# Patient Record
Sex: MALE | Race: BLACK OR AFRICAN AMERICAN | Employment: OTHER | RURAL
[De-identification: names, ages, dates, MRNs, and addresses within clinical notes are randomized per-mention and may not be internally consistent; named-entity substitution may affect disease eponyms.]

---

## 2017-03-09 ENCOUNTER — OFFICE VISIT (OUTPATIENT)
Dept: FAMILY MEDICINE CLINIC | Age: 62
End: 2017-03-09

## 2017-03-09 VITALS
OXYGEN SATURATION: 99 % | DIASTOLIC BLOOD PRESSURE: 72 MMHG | SYSTOLIC BLOOD PRESSURE: 114 MMHG | BODY MASS INDEX: 26.97 KG/M2 | WEIGHT: 167.8 LBS | HEART RATE: 81 BPM | HEIGHT: 66 IN | TEMPERATURE: 98.2 F | RESPIRATION RATE: 16 BRPM

## 2017-03-09 DIAGNOSIS — Z02.4 ENCOUNTER FOR CDL (COMMERCIAL DRIVING LICENSE) EXAM: Primary | ICD-10-CM

## 2017-03-09 LAB
BILIRUB UR QL STRIP: NEGATIVE
GLUCOSE UR-MCNC: NEGATIVE MG/DL
KETONES P FAST UR STRIP-MCNC: NEGATIVE MG/DL
PH UR STRIP: 6.5 [PH] (ref 4.6–8)
PROT UR QL STRIP: NORMAL MG/DL
SP GR UR STRIP: 1.01 (ref 1–1.03)
UA UROBILINOGEN AMB POC: NORMAL (ref 0.2–1)
URINALYSIS CLARITY POC: NORMAL
URINALYSIS COLOR POC: NORMAL
URINE BLOOD POC: NORMAL
URINE LEUKOCYTES POC: NORMAL
URINE NITRITES POC: NEGATIVE

## 2017-03-09 RX ORDER — VERAPAMIL HYDROCHLORIDE 120 MG/1
120 TABLET, FILM COATED ORAL 3 TIMES DAILY
Status: ON HOLD | COMMUNITY
End: 2022-01-04 | Stop reason: CLARIF

## 2017-03-09 NOTE — PROGRESS NOTES
Forrest Washington is a 58 y.o. male who presents to the office today with the following:  Chief Complaint   Patient presents with    Employment Physical     CDL       HPI    ROS  See HPI. Allergies   Allergen Reactions    Asa-Acetaminophen-Caff-Buffers Unknown (comments)       Current Outpatient Prescriptions   Medication Sig    verapamil (CALAN) 120 mg tablet Take 120 mg by mouth three (3) times daily. No current facility-administered medications for this visit.         Past Medical History:   Diagnosis Date    Hypertension        Past Surgical History:   Procedure Laterality Date    HX HERNIA REPAIR      HX PROSTATECTOMY         Social History     Social History    Marital status:      Spouse name: N/A    Number of children: N/A    Years of education: N/A     Social History Main Topics    Smoking status: Former Smoker     Quit date: 4/1/1988    Smokeless tobacco: None    Alcohol use No    Drug use: No    Sexual activity: Not Asked     Other Topics Concern    None     Social History Narrative    None       Family History   Problem Relation Age of Onset    Dementia Mother          Physical Exam:  Visit Vitals    /72 (BP 1 Location: Left arm, BP Patient Position: Sitting)    Pulse 81    Temp 98.2 °F (36.8 °C) (Oral)    Resp 16    Ht 5' 6\" (1.676 m)    Wt 167 lb 12.8 oz (76.1 kg)    SpO2 99%    BMI 27.08 kg/m2     Physical Exam    Assessment/Plan:  {No Diagnosis Found}    Follow-up Disposition: Not on File    Yakelin Arellano PA-C

## 2017-03-09 NOTE — MR AVS SNAPSHOT
Visit Information Date & Time Provider Department Dept. Phone Encounter #  
 3/9/2017 10:00 AM Ricardo Ramsey PA-C [address] [id number] Follow-up Instructions Return in about 1 year (around 3/9/2018). Follow-up and Disposition History Upcoming Health Maintenance Date Due Hepatitis C Screening 1955 DTaP/Tdap/Td series (1 - Tdap) 2/16/1976 FOBT Q 1 YEAR AGE 50-75 2/16/2005 ZOSTER VACCINE AGE 60> 2/16/2015 INFLUENZA AGE 9 TO ADULT 8/1/2016 Allergies as of 3/9/2017  Review Complete On: 3/9/2017 By: Ricardo Ramsey PA-C Severity Noted Reaction Type Reactions Asa-acetaminophen-caff-buffers  03/09/2017    Unknown (comments) Current Immunizations  Never Reviewed No immunizations on file. Not reviewed this visit You Were Diagnosed With   
  
 Codes Comments Encounter for CDL (commercial driving license) exam    -  Primary ICD-10-CM: Z02.4 ICD-9-CM: V70.5 Vitals BP Pulse Temp Resp Height(growth percentile) Weight(growth percentile) 114/72 (BP 1 Location: Left arm, BP Patient Position: Sitting) 81 98.2 °F (36.8 °C) (Oral) 16 5' 6\" (1.676 m) 167 lb 12.8 oz (76.1 kg) SpO2 BMI Smoking Status 99% 27.08 kg/m2 Former Smoker Vitals History BMI and BSA Data Body Mass Index Body Surface Area 27.08 kg/m 2 1.88 m 2 Your Updated Medication List  
  
   
This list is accurate as of: 3/9/17 11:18 AM.  Always use your most recent med list.  
  
  
  
  
 verapamil 120 mg tablet Commonly known as:  CALAN Take 120 mg by mouth three (3) times daily. We Performed the Following AMB POC URINALYSIS DIP STICK MANUAL W/O MICRO [29983 CPT(R)] Follow-up Instructions Return in about 1 year (around 3/9/2018). Introducing [facility] & HEALTH SERVICES!    
 Kirk Garcia introduces [employer] patient portal. Now you can access parts of your medical record, email your doctor's office, and request medication refills online. 1. In your internet browser, go to [email or web address] 2. Click on the First Time User? Click Here link in the Sign In box. You will see the New Member Sign Up page. 3. Enter your [facility] Access Code exactly as it appears below. You will not need to use this code after youve completed the sign-up process. If you do not sign up before the expiration date, you must request a new code. · [facility] Access Code: [email or web address] Expires: 6/7/2017  9:48 AM 
 
4. Enter the last four digits of your Social Security Number (xxxx) and Date of Birth (mm/dd/yyyy) as indicated and click Submit. You will be taken to the next sign-up page. 5. Create a [facility] ID. This will be your [facility] login ID and cannot be changed, so think of one that is secure and easy to remember. 6. Create a [facility] password. You can change your password at any time. 7. Enter your Password Reset Question and Answer. This can be used at a later time if you forget your password. 8. Enter your e-mail address. You will receive e-mail notification when new information is available in [address] 9. Click Sign Up. You can now view and download portions of your medical record. 10. Click the Download Summary menu link to download a portable copy of your medical information. If you have questions, please visit the Frequently Asked Questions section of the [facility] website. Remember, [facility] is NOT to be used for urgent needs. For medical emergencies, dial 911. Now available from your iPhone and Android! Please provide this summary of care documentation to your next provider. Your primary care clinician is listed as NOT ON FILE. If you have any questions after today's visit, please call [phone].

## 2017-03-09 NOTE — PROGRESS NOTES
Emir Mcdermott is a 58 y.o. 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 he reports feeling well today with no complaints. Does report recent prostatectomy for prostate CA (in remission), still leaking urine, but off all pain medication, feeling well, only 2 more days on abx. Only other med is Verapamil for BP which is well-controlled. *See scanned media for full history and physical exam.    Visit Vitals    /72 (BP 1 Location: Left arm, BP Patient Position: Sitting)    Pulse 81    Temp 98.2 °F (36.8 °C) (Oral)    Resp 16    Ht 5' 6\" (1.676 m)    Wt 167 lb 12.8 oz (76.1 kg)    SpO2 99%    BMI 27.08 kg/m2       Results for orders placed or performed in visit on 03/09/17   AMB POC URINALYSIS DIP STICK MANUAL W/O MICRO   Result Value Ref Range    Color (UA POC) Red     Clarity (UA POC) Slightly Cloudy     Glucose (UA POC) Negative Negative    Bilirubin (UA POC) Negative Negative    Ketones (UA POC) Negative Negative    Specific gravity (UA POC) 1.010 1.001 - 1.035    Blood (UA POC) 3+ Negative    pH (UA POC) 6.5 4.6 - 8.0    Protein (UA POC) 2+ Negative mg/dL    Urobilinogen (UA POC) 0.2 mg/dL 0.2 - 1    Nitrites (UA POC) Negative Negative    Leukocyte esterase (UA POC) Trace Negative     DOT forms completed and scanned into patient's chart.     Marck Boyer PA-C

## 2018-03-12 ENCOUNTER — OFFICE VISIT (OUTPATIENT)
Dept: INTERNAL MEDICINE CLINIC | Age: 63
End: 2018-03-12

## 2018-03-12 VITALS
OXYGEN SATURATION: 96 % | SYSTOLIC BLOOD PRESSURE: 132 MMHG | HEIGHT: 60 IN | HEART RATE: 75 BPM | BODY MASS INDEX: 36.32 KG/M2 | RESPIRATION RATE: 16 BRPM | TEMPERATURE: 97.8 F | DIASTOLIC BLOOD PRESSURE: 80 MMHG | WEIGHT: 185 LBS

## 2018-03-12 DIAGNOSIS — Z02.89 ENCOUNTER FOR OCCUPATIONAL PHYSICAL EXAMINATION: Primary | ICD-10-CM

## 2018-03-12 DIAGNOSIS — I10 ESSENTIAL HYPERTENSION: ICD-10-CM

## 2018-03-12 PROBLEM — Z85.46 HISTORY OF PROSTATE CANCER: Status: ACTIVE | Noted: 2018-03-12

## 2018-03-12 LAB
BILIRUB UR QL STRIP: NEGATIVE
GLUCOSE UR-MCNC: NEGATIVE MG/DL
KETONES P FAST UR STRIP-MCNC: NEGATIVE MG/DL
PH UR STRIP: 6.5 [PH] (ref 4.6–8)
PROT UR QL STRIP: NEGATIVE
SP GR UR STRIP: 1.02 (ref 1–1.03)
UA UROBILINOGEN AMB POC: NORMAL (ref 0.2–1)
URINALYSIS CLARITY POC: CLEAR
URINALYSIS COLOR POC: NORMAL
URINE BLOOD POC: NORMAL
URINE LEUKOCYTES POC: NEGATIVE
URINE NITRITES POC: NEGATIVE

## 2018-03-12 RX ORDER — BICALUTAMIDE 50 MG/1
50 TABLET, FILM COATED ORAL DAILY
COMMUNITY
End: 2020-02-21 | Stop reason: ALTCHOICE

## 2018-03-12 NOTE — PROGRESS NOTES
Chief Complaint   Patient presents with    Physical     DOT     I have reviewed the patient's medical history in detail and updated the computerized patient record. Health Maintenance reviewed. 1. Have you been to the ER, urgent care clinic since your last visit? Hospitalized since your last visit?no    2. Have you seen or consulted any other health care providers outside of the [address] [place] [address] [place] since your last visit? Include any pap smears or colon screening. No    Encouraged pt to discuss pt's wishes with spouse/partner/family and bring them in the next appt to follow thru with the Advanced Directive    Fall Risk Assessment, last 12 mths 3/12/2018   Able to walk? Yes   Fall in past 12 months? No       PHQ over the last two weeks 3/12/2018   Little interest or pleasure in doing things Several days   Feeling down, depressed or hopeless Several days   Total Score PHQ 2 2       Abuse Screening Questionnaire 3/12/2018   Do you ever feel afraid of your partner? N   Are you in a relationship with someone who physically or mentally threatens you? N   Is it safe for you to go home?  Y       ADL Assessment 3/12/2018   Feeding yourself No Help Needed   Getting from bed to chair No Help Needed   Getting dressed No Help Needed   Bathing or showering No Help Needed   Walk across the room (includes cane/walker) No Help Needed   Using the telphone No Help Needed   Taking your medications No Help Needed   Preparing meals No Help Needed   Managing money (expenses/bills) No Help Needed   Moderately strenuous housework (laundry) No Help Needed   Shopping for personal items (toiletries/medicines) No Help Needed   Shopping for groceries No Help Needed   Driving No Help Needed   Climbing a flight of stairs No Help Needed   Getting to places beyond walking distances No Help Needed

## 2018-03-12 NOTE — MR AVS SNAPSHOT
[address] [facility] 390 3946 [facility] 
[phone] Patient: Sarita Jameson MRN: [id number] [facility]:[birth year] Visit Information Date & Time Provider Department Dept. Phone Encounter #  
 3/12/2018  2:00 PM Sarah Christianson MD [place] [address] [phone] [id number] Follow-up Instructions Return in about 1 year (around 3/12/2019) for routine follow up. Upcoming Health Maintenance Date Due Hepatitis C Screening 1955 DTaP/Tdap/Td series (1 - Tdap) 2/16/1976 FOBT Q 1 YEAR AGE 50-75 2/16/2005 ZOSTER VACCINE AGE 60> 12/16/2014 Allergies as of 3/12/2018  Review Complete On: 3/12/2018 By: Sarah Christianson MD  
  
 Severity Noted Reaction Type Reactions Asa-acetaminophen-caff-buffers  03/09/2017    Unknown (comments) Current Immunizations  Never Reviewed No immunizations on file. Not reviewed this visit You Were Diagnosed With   
  
 Codes Comments Encounter for occupational physical examination    -  Primary ICD-10-CM: Z02.1 ICD-9-CM: V70.5 Essential hypertension     ICD-10-CM: I10 
ICD-9-CM: 401.9 Vitals BP Pulse Temp Resp Height(growth percentile) Weight(growth percentile) 132/80 (BP 1 Location: Right arm, BP Patient Position: Sitting) 75 97.8 °F (36.6 °C) (Oral) 16 5' (1.524 m) 185 lb (83.9 kg) SpO2 BMI Smoking Status 96% 36.13 kg/m2 Former Smoker Vitals History BMI and BSA Data Body Mass Index Body Surface Area  
 36.13 kg/m 2 1.88 m 2 Preferred Pharmacy Pharmacy Name Phone [address] [facility],[address], [address] [place] [address] [phone] [facility] [phone] Your Updated Medication List  
  
   
This list is accurate as of 3/12/18  2:40 PM.  Always use your most recent med list.  
  
  
  
  
 bicalutamide 50 mg tablet Commonly known as:  CASODEX Take 50 mg by mouth daily. verapamil 120 mg tablet Commonly known as:  CALAN Take 120 mg by mouth three (3) times daily. We Performed the Following AMB POC URINALYSIS DIP STICK AUTO W/O MICRO [43968 CPT(R)] Follow-up Instructions Return in about 1 year (around 3/12/2019) for routine follow up. Introducing [facility] & HEALTH SERVICES! [facility] introduces [employer] patient portal. Now you can access parts of your medical record, email your doctor's office, and request medication refills online. 1. In your internet browser, go to https://[employer]. [employer] 2. Click on the First Time User? Click Here link in the Sign In box. You will see the New Member Sign Up page. 3. Enter your [employer] Access Code exactly as it appears below. You will not need to use this code after youve completed the sign-up process. If you do not sign up before the expiration date, you must request a new code. · [employer] Access Code: [email or web address] Expires: 6/10/2018  1:52 PM 
 
4. Enter the last four digits of your Social Security Number (xxxx) and Date of Birth (mm/dd/yyyy) as indicated and click Submit. You will be taken to the next sign-up page. 5. Create a [employer]esting ID. This will be your Smartesting login ID and cannot be changed, so think of one that is secure and easy to remember. 6. Create a [employer] password. You can change your password at any time. 7. Enter your Password Reset Question and Answer. This can be used at a later time if you forget your password. 8. Enter your e-mail address. You will receive e-mail notification when new information is available in [address] 9. Click Sign Up. You can now view and download portions of your medical record. 10. Click the Download Summary menu link to download a portable copy of your medical information. If you have questions, please visit the Frequently Asked Questions section of the [employer] website. Remember, [employer] is NOT to be used for urgent needs. For medical emergencies, dial 911. Now available from your iPhone and Android! Please provide this summary of care documentation to your next provider. Your primary care clinician is listed as NONE. If you have any questions after today's visit, please call [phone].

## 2018-03-12 NOTE — PROGRESS NOTES
DOT PHYSICAL    Antonio Sprague 61 y.o. presents for a DOT physical.  He has the following concerns: none feels well    No dizziness, syncope or fainting, exertional chest pain, excessive shortness of breath, focal weakness, abdominal pain, severe depressed mood, thoughts of suicide, recreational drug abuse, excessive alcohol usage. No excessive sleepiness in the day time falling asleep at the wheel or any motor vehicle accidents. No diabetes. No insulin or narcotic agents. Patient Active Problem List   Diagnosis Code    History of prostate cancer Z85.46    Essential hypertension I10       Past Medical History:   Diagnosis Date    Hypertension      Past Surgical History:   Procedure Laterality Date    HX HERNIA REPAIR      HX PROSTATECTOMY       Social History     Social History    Marital status:      Spouse name: N/A    Number of children: 0    Years of education: N/A     Occupational History    tractor trailer      Social History Main Topics    Smoking status: Former Smoker     Quit date: 4/1/1988    Smokeless tobacco: Never Used    Alcohol use No    Drug use: No    Sexual activity: Yes     Partners: Female     Other Topics Concern    Not on file     Social History Narrative    Niece and mother lives with him       Current Outpatient Prescriptions   Medication Sig Dispense Refill    bicalutamide (CASODEX) 50 mg tablet Take 50 mg by mouth daily.  verapamil (CALAN) 120 mg tablet Take 120 mg by mouth three (3) times daily.            Results for orders placed or performed in visit on 03/12/18   AMB POC URINALYSIS DIP STICK AUTO W/O MICRO   Result Value Ref Range    Color (UA POC) Brit     Clarity (UA POC) Clear     Glucose (UA POC) Negative Negative    Bilirubin (UA POC) Negative Negative    Ketones (UA POC) Negative Negative    Specific gravity (UA POC) 1.020 1.001 - 1.035    Blood (UA POC) Trace Negative    pH (UA POC) 6.5 4.6 - 8.0 Protein (UA POC) Negative Negative    Urobilinogen (UA POC) 0.2 mg/dL 0.2 - 1    Nitrites (UA POC) Negative Negative    Leukocyte esterase (UA POC) Negative Negative         ROS: 12 point system revived,please see HPI for pertinent positives. OBJECTIVE:     Visit Vitals    /80 (BP 1 Location: Right arm, BP Patient Position: Sitting)    Pulse 74       Vision meet standards and hearing test good. Visual Acuity Screening    Right eye Left eye Both eyes   Without correction: 20/25 20/25 20/25   With correction:      Hearing Screening Comments: Hearing L/R &gt;5 feet       Physical Examination:    General appearance - alert, well appearing, and in no distress. HEENT  PERRLA, EOMI, Sclera clear, anicteric, Oropharynx clear, no lesions. Chest - RRR S1, S2 heard, no peripheral edema. Lungs- Clear to auscultation, no wheezes, rales or rhonchi, has symmetric air entry    Neck- Supple, No adenopathy. Neuro- CN 2 to 11 grossly normal, No neuro deficits              DTR 2/4, strength on upper/lower ext 5/5 sohan    Abdomen/groin- ND,NT, No hernia    ROM: Good ROM of upper and lower extremities. Feet normal      Psy- Mood and Affect WNL      ASSESSMENT/PLAN       ICD-10-CM ICD-9-CM    1. Encounter for occupational physical examination Z02.1 V70.5 AMB POC URINALYSIS DIP STICK AUTO W/O MICRO   2. Essential hypertension I10 401.9          Healthy appearing person. Advised routine care with PCP. 1 yr DOT card with no restrictions given. DOT papers kept in file and [facility] web site updated. I have discussed the diagnosis with the patient and the intended plan as seen in the above orders. The patient has received an after-visit summary and questions were answered concerning future plans. I have discussed medication side effects and warnings with the patient as well. Pt verbalizes understanding.

## 2019-02-22 ENCOUNTER — OFFICE VISIT (OUTPATIENT)
Dept: INTERNAL MEDICINE CLINIC | Age: 64
End: 2019-02-22

## 2019-02-22 VITALS
HEIGHT: 67 IN | TEMPERATURE: 98.2 F | SYSTOLIC BLOOD PRESSURE: 132 MMHG | WEIGHT: 185 LBS | HEART RATE: 75 BPM | OXYGEN SATURATION: 98 % | BODY MASS INDEX: 29.03 KG/M2 | RESPIRATION RATE: 17 BRPM | DIASTOLIC BLOOD PRESSURE: 90 MMHG

## 2019-02-22 DIAGNOSIS — I10 ESSENTIAL HYPERTENSION: ICD-10-CM

## 2019-02-22 DIAGNOSIS — Z02.89 ENCOUNTER FOR OCCUPATIONAL PHYSICAL EXAMINATION: Primary | ICD-10-CM

## 2019-02-22 LAB
BILIRUB UR QL STRIP: NEGATIVE
GLUCOSE UR-MCNC: NEGATIVE MG/DL
KETONES P FAST UR STRIP-MCNC: NEGATIVE MG/DL
PH UR STRIP: 6 [PH] (ref 4.6–8)
PROT UR QL STRIP: NEGATIVE
SP GR UR STRIP: 6 (ref 1–1.03)
UA UROBILINOGEN AMB POC: ABNORMAL (ref 0.2–1)
URINALYSIS CLARITY POC: CLEAR
URINALYSIS COLOR POC: YELLOW
URINE BLOOD POC: NEGATIVE
URINE LEUKOCYTES POC: NEGATIVE
URINE NITRITES POC: NEGATIVE

## 2019-02-22 NOTE — PROGRESS NOTES
Chief Complaint Patient presents with  Physical  
  DOT I have reviewed the patient's medical history in detail and updated the computerized patient record. Health Maintenance reviewed. 1. Have you been to the ER, urgent care clinic since your last visit? Hospitalized since your last visit?no 2. Have you seen or consulted any other health care providers outside of the [address] [place] [address] [place] since your last visit? Include any pap smears or colon screening. No 
 
 
Encouraged pt to discuss pt's wishes with spouse/partner/family and bring them in the next appt to follow thru with the Advanced Directive Fall Risk Assessment, last 12 mths 2/22/2019 Able to walk? Yes Fall in past 12 months? No  
 
 
3 most recent PHQ Screens 2/22/2019 Little interest or pleasure in doing things Several days Feeling down, depressed, irritable, or hopeless Several days Total Score PHQ 2 2 Abuse Screening Questionnaire 2/22/2019 Do you ever feel afraid of your partner? Karina Crafts Are you in a relationship with someone who physically or mentally threatens you? Karina CraNovogen Is it safe for you to go home? Y  
 
 

## 2019-02-22 NOTE — PROGRESS NOTES
DOT PHYSICAL Hillary Lapping 59 y.o. presents for a DOT physical. 
He has the following concerns: none No dizziness, syncope or fainting, exertional chest pain, excessive shortness of breath, focal weakness, abdominal pain, severe depressed mood, thoughts of suicide, recreational drug abuse, excessive alcohol usage. No excessive sleepiness in the day time falling asleep at the wheel or any motor vehicle accidents. No diabetes. No insulin or narcotic agents. Patient Active Problem List  
Diagnosis Code  History of prostate cancer Z85.46  
 Essential hypertension I10 Past Medical History:  
Diagnosis Date  Hypertension Past Surgical History:  
Procedure Laterality Date  HX HERNIA REPAIR    
 HX PROSTATECTOMY Social History Socioeconomic History  Marital status:  Spouse name: Not on file  Number of children: 0  
 Years of education: Not on file  Highest education level: Not on file Social Needs  Financial resource strain: Not on file  Food insecurity - worry: Not on file  Food insecurity - inability: Not on file  Transportation needs - medical: Not on file  Transportation needs - non-medical: Not on file Occupational History  Occupation: tractor trailer Tobacco Use  Smoking status: Former Smoker Last attempt to quit: 1988 Years since quittin.9  Smokeless tobacco: Never Used Substance and Sexual Activity  Alcohol use: No  
 Drug use: No  
 Sexual activity: Yes  
  Partners: Female Other Topics Concern  Not on file Social History Narrative Niece and mother lives with him Current Outpatient Medications Medication Sig Dispense Refill  bicalutamide (CASODEX) 50 mg tablet Take 50 mg by mouth daily.  verapamil (CALAN) 120 mg tablet Take 120 mg by mouth three (3) times daily. Results for orders placed or performed in visit on 02/22/19 AMB POC URINALYSIS DIP STICK AUTO W/O MICRO Result Value Ref Range Color (UA POC) Yellow Clarity (UA POC) Clear Glucose (UA POC) Negative Negative Bilirubin (UA POC) Negative Negative Ketones (UA POC) Negative Negative Specific gravity (UA POC) 6.000 (A) 1.001 - 1.035 Blood (UA POC) Negative Negative pH (UA POC) 6.0 4.6 - 8.0 Protein (UA POC) Negative Negative Urobilinogen (UA POC) 0.2 mg/dL 0.2 - 1 Nitrites (UA POC) Negative Negative Leukocyte esterase (UA POC) Negative Negative ROS: 12 point system revived,please see HPI for pertinent positives. OBJECTIVE: 
  
Visit Vitals /90 (BP 1 Location: Left arm, BP Patient Position: Sitting) Pulse 75 Temp 98.2 °F (36.8 °C) (Oral) Resp 17 Ht 5' 6.5\" (1.689 m) Wt 185 lb (83.9 kg) SpO2 98% BMI 29.41 kg/m² Vision meet standards and hearing test good. Visual Acuity Screening Right eye Left eye Both eyes Without correction:     
With correction: 20/20 20/25 20/20 Hearing Screening Comments: Hearing L/R &gt;5 feet Physical Examination: 
 
General appearance - alert, well appearing, and in no distress. HEENT  PERRLA, EOMI, Sclera clear, anicteric, Oropharynx clear, no lesions. Chest - RRR S1, S2 heard, no peripheral edema. Lungs- Clear to auscultation, no wheezes, rales or rhonchi, has symmetric air entry Neck- Supple, No adenopathy. Neuro- CN 2 to 11 grossly normal, No neuro deficits DTR 2/4, strength on upper/lower ext 5/5 sohan Abdomen/groin- ND,NT, No hernia ROM: Good ROM of upper and lower extremities. Feet normal 
   
Psy- Mood and Affect WNL ASSESSMENT/PLAN  
 
  ICD-10-CM ICD-9-CM 1. Encounter for occupational physical examination Z02.89 V70.5 AMB POC URINALYSIS DIP STICK AUTO W/O MICRO 2. Essential hypertension I10 401.9 Healthy appearing person. Advised routine care with PCP. 1 yr DOT card with no restrictions given. DOT papers kept in file and [facility] web site updated. I have discussed the diagnosis with the patient and the intended plan as seen in the above orders. The patient has received an after-visit summary and questions were answered concerning future plans. I have discussed medication side effects and warnings with the patient as well. Pt verbalizes understanding.

## 2020-02-11 ENCOUNTER — DOCUMENTATION ONLY (OUTPATIENT)
Dept: INTERNAL MEDICINE CLINIC | Age: 65
End: 2020-02-11

## 2020-02-21 ENCOUNTER — OFFICE VISIT (OUTPATIENT)
Dept: INTERNAL MEDICINE CLINIC | Age: 65
End: 2020-02-21

## 2020-02-21 VITALS
OXYGEN SATURATION: 98 % | WEIGHT: 185 LBS | BODY MASS INDEX: 29.03 KG/M2 | HEIGHT: 67 IN | TEMPERATURE: 97.8 F | SYSTOLIC BLOOD PRESSURE: 117 MMHG | DIASTOLIC BLOOD PRESSURE: 67 MMHG | HEART RATE: 85 BPM | RESPIRATION RATE: 16 BRPM

## 2020-02-21 DIAGNOSIS — I10 ESSENTIAL HYPERTENSION: ICD-10-CM

## 2020-02-21 DIAGNOSIS — Z02.89 ENCOUNTER FOR OCCUPATIONAL PHYSICAL EXAMINATION: Primary | ICD-10-CM

## 2020-02-21 LAB
BILIRUB UR QL STRIP: NEGATIVE
GLUCOSE UR-MCNC: NEGATIVE MG/DL
KETONES P FAST UR STRIP-MCNC: NEGATIVE MG/DL
PH UR STRIP: 6 [PH] (ref 4.6–8)
PROT UR QL STRIP: NEGATIVE
SP GR UR STRIP: 1.03 (ref 1–1.03)
UA UROBILINOGEN AMB POC: NORMAL (ref 0.2–1)
URINALYSIS CLARITY POC: CLEAR
URINALYSIS COLOR POC: YELLOW
URINE BLOOD POC: NEGATIVE
URINE LEUKOCYTES POC: NEGATIVE
URINE NITRITES POC: NEGATIVE

## 2020-02-21 NOTE — PROGRESS NOTES
DOT PHYSICAL    Josphine Sos 72 y.o. presents for a DOT physical.  He has the following concerns: none finished Rx for prostate CA PSA undetectable    No dizziness, syncope or fainting, exertional chest pain, excessive shortness of breath, focal weakness, abdominal pain, severe depressed mood, thoughts of suicide, recreational drug abuse, excessive alcohol usage. No excessive sleepiness in the day time falling asleep at the wheel or any motor vehicle accidents. No diabetes. No insulin or narcotic agents.       Patient Active Problem List   Diagnosis Code    History of prostate cancer Z85.46    Essential hypertension I10     Past Medical History:   Diagnosis Date    Hypertension      Past Surgical History:   Procedure Laterality Date    HX HERNIA REPAIR      HX PROSTATECTOMY       Social History     Socioeconomic History    Marital status:      Spouse name: Not on file    Number of children: 0    Years of education: Not on file    Highest education level: Not on file   Occupational History    Occupation: [employer]   Social Needs    Financial resource strain: Not on file    Food insecurity:     Worry: Not on file     Inability: Not on file    Transportation needs:     Medical: Not on file     Non-medical: Not on file   Tobacco Use    Smoking status: Former Smoker     Last attempt to quit: 1988     Years since quittin.9    Smokeless tobacco: Never Used   Substance and Sexual Activity    Alcohol use: No    Drug use: No    Sexual activity: Yes     Partners: Female   Lifestyle    Physical activity:     Days per week: Not on file     Minutes per session: Not on file    Stress: Not on file   Relationships    Social connections:     Talks on phone: Not on file     Gets together: Not on file     Attends Voodoo service: Not on file     Active member of club or organization: Not on file     Attends meetings of clubs or organizations: Not on file     Relationship status: Not on file    Intimate partner violence:     Fear of current or ex partner: Not on file     Emotionally abused: Not on file     Physically abused: Not on file     Forced sexual activity: Not on file   Other Topics Concern    Not on file   Social History Narrative    Niece and mother lives with him       Current Outpatient Medications   Medication Sig Dispense Refill    verapamil (CALAN) 120 mg tablet Take 120 mg by mouth three (3) times daily. Results for orders placed or performed in visit on 02/21/20   AMB POC URINALYSIS DIP STICK AUTO W/O MICRO   Result Value Ref Range    Color (UA POC) Yellow     Clarity (UA POC) Clear     Glucose (UA POC) Negative Negative    Bilirubin (UA POC) Negative Negative    Ketones (UA POC) Negative Negative    Specific gravity (UA POC) 1.030 1.001 - 1.035    Blood (UA POC) Negative Negative    pH (UA POC) 6.0 4.6 - 8.0    Protein (UA POC) Negative Negative    Urobilinogen (UA POC) 0.2 mg/dL 0.2 - 1    Nitrites (UA POC) Negative Negative    Leukocyte esterase (UA POC) Negative Negative         ROS: 12 point system revived,please see HPI for pertinent positives. OBJECTIVE:     Visit Vitals  /67 (BP 1 Location: Left arm, BP Patient Position: Sitting)   Pulse 85   Temp 97.8 °F (36.6 °C) (Temporal)   Resp 16   Ht 5' 6.5\" (1.689 m)   Wt 185 lb (83.9 kg)   SpO2 98%   BMI 29.41 kg/m²       Vision meet standards and hearing test good. Visual Acuity Screening    Right eye Left eye Both eyes   Without correction:      With correction: 20/30 20/30 20/30   Hearing Screening Comments: Hearing L/R &gt;5 feet       Physical Examination:    General appearance - alert, well appearing, and in no distress. HEENT  PERRLA, EOMI, Sclera clear, anicteric, Oropharynx clear, no lesions. Chest - RRR S1, S2 heard, no peripheral edema.      Lungs- Clear to auscultation, no wheezes, rales or rhonchi, has symmetric air entry    Neck- Supple, No adenopathy. Neuro- CN 2 to 11 grossly normal, No neuro deficits              DTR 2/4, strength on upper/lower ext 5/5 sohan    Abdomen/groin- ND,NT, No hernia    ROM: Good ROM of upper and lower extremities. Feet normal      Psy- Mood and Affect WNL      ASSESSMENT/PLAN       ICD-10-CM ICD-9-CM    1. Encounter for occupational physical examination Z02.89 V70.5 AMB POC URINALYSIS DIP STICK AUTO W/O MICRO      CANCELED: AMB POC URINALYSIS DIP STICK AUTO W/O MICRO   2. Essential hypertension I10 401.9      Orders Placed This Encounter    AMB POC URINALYSIS DIP STICK AUTO W/O MICRO     Hypertension well controlled with his diagnosis only gets a year    Healthy appearing person. Advised routine care with PCP. 1 yr DOT card with no restrictions given. DOT papers kept in file and [facility] web site updated. I have discussed the diagnosis with the patient and the intended plan as seen in the above orders. The patient has received an after-visit summary and questions were answered concerning future plans. I have discussed medication side effects and warnings with the patient as well. Pt verbalizes understanding.

## 2020-02-21 NOTE — PROGRESS NOTES
Chief Complaint   Patient presents with    Physical     DOT     I have reviewed the patient's medical history in detail and updated the computerized patient record. Health Maintenance reviewed. 1. Have you been to the ER, urgent care clinic since your last visit? Hospitalized since your last visit?no    2. Have you seen or consulted any other health care providers outside of the [address] [place] [address] [place] since your last visit? Include any pap smears or colon screening. No      Encouraged pt to discuss pt's wishes with spouse/partner/family and bring them in the next appt to follow thru with the Advanced Directive    Fall Risk Assessment, last 12 mths 2/21/2020   Able to walk? Yes   Fall in past 12 months? No       3 most recent PHQ Screens 2/21/2020   Little interest or pleasure in doing things Several days   Feeling down, depressed, irritable, or hopeless Several days   Total Score PHQ 2 2       Abuse Screening Questionnaire 2/21/2020   Do you ever feel afraid of your partner? N   Are you in a relationship with someone who physically or mentally threatens you? N   Is it safe for you to go home?  Y       ADL Assessment 2/21/2020   Feeding yourself No Help Needed   Getting from bed to chair No Help Needed   Getting dressed No Help Needed   Bathing or showering No Help Needed   Walk across the room (includes cane/walker) No Help Needed   Using the telphone No Help Needed   Taking your medications No Help Needed   Preparing meals No Help Needed   Managing money (expenses/bills) No Help Needed   Moderately strenuous housework (laundry) No Help Needed   Shopping for personal items (toiletries/medicines) No Help Needed   Shopping for groceries No Help Needed   Driving No Help Needed   Climbing a flight of stairs No Help Needed   Getting to places beyond walking distances No Help Needed

## 2021-02-23 ENCOUNTER — OFFICE VISIT (OUTPATIENT)
Dept: INTERNAL MEDICINE CLINIC | Age: 66
End: 2021-02-23

## 2021-02-23 VITALS
RESPIRATION RATE: 16 BRPM | OXYGEN SATURATION: 95 % | WEIGHT: 185 LBS | HEART RATE: 82 BPM | TEMPERATURE: 97 F | SYSTOLIC BLOOD PRESSURE: 138 MMHG | HEIGHT: 67 IN | DIASTOLIC BLOOD PRESSURE: 83 MMHG | BODY MASS INDEX: 29.03 KG/M2

## 2021-02-23 DIAGNOSIS — Z02.89 ENCOUNTER FOR OCCUPATIONAL PHYSICAL EXAMINATION: Primary | ICD-10-CM

## 2021-02-23 DIAGNOSIS — I10 ESSENTIAL HYPERTENSION: ICD-10-CM

## 2021-02-23 LAB
BILIRUB UR QL STRIP: NEGATIVE
GLUCOSE UR-MCNC: NEGATIVE MG/DL
KETONES P FAST UR STRIP-MCNC: NEGATIVE MG/DL
PH UR STRIP: 6 [PH] (ref 4.6–8)
PROT UR QL STRIP: NEGATIVE
SP GR UR STRIP: 1.02 (ref 1–1.03)
UA UROBILINOGEN AMB POC: NORMAL (ref 0.2–1)
URINALYSIS CLARITY POC: CLEAR
URINALYSIS COLOR POC: YELLOW
URINE BLOOD POC: NORMAL
URINE LEUKOCYTES POC: NEGATIVE
URINE NITRITES POC: NEGATIVE

## 2021-02-23 PROCEDURE — 99213 OFFICE O/P EST LOW 20 MIN: CPT | Performed by: FAMILY MEDICINE

## 2021-02-23 PROCEDURE — 81003 URINALYSIS AUTO W/O SCOPE: CPT | Performed by: FAMILY MEDICINE

## 2021-02-23 NOTE — PROGRESS NOTES
Chief Complaint   Patient presents with    Hypertension     DOT     Health Maintenance reviewed. I have reviewed the patient's medical history in detail and updated the computerized patient record. Patient has not been out of the country in (12 months), NO diarrhea, NO cough, NO chest conjestion, NO temp. Pt has not been around anyone with these symptoms. 1. Have you been to the ER, urgent care clinic since your last visit? Hospitalized since your last visit?no    2. Have you seen or consulted any other health care providers outside of the [address] [place] [address] since your last visit? Include any pap smears or colon screening. No      Encouraged pt to discuss pt's wishes with spouse/partner/family and bring them in the next appt to follow thru with the Advanced Directive      Fall Risk Assessment, last 12 mths 2/23/2021   Able to walk? Yes   Fall in past 12 months? 0   Do you feel unsteady? 0   Are you worried about falling 0       3 most recent PHQ Screens 2/23/2021   Little interest or pleasure in doing things Several days   Feeling down, depressed, irritable, or hopeless Several days   Total Score PHQ 2 2       Abuse Screening Questionnaire 2/23/2021   Do you ever feel afraid of your partner? N   Are you in a relationship with someone who physically or mentally threatens you? N   Is it safe for you to go home?  Y       ADL Assessment 2/23/2021   Feeding yourself No Help Needed   Getting from bed to chair No Help Needed   Getting dressed No Help Needed   Bathing or showering No Help Needed   Walk across the room (includes cane/walker) No Help Needed   Using the telphone No Help Needed   Taking your medications No Help Needed   Preparing meals No Help Needed   Managing money (expenses/bills) No Help Needed   Moderately strenuous housework (laundry) No Help Needed   Shopping for personal items (toiletries/medicines) No Help Needed   Shopping for groceries No Help Needed   Driving No Help Needed Climbing a flight of stairs No Help Needed   Getting to places beyond walking distances No Help Needed

## 2021-02-23 NOTE — PROGRESS NOTES
DOT PHYSICAL    Dylon Phillip 77 y.o. presents for a DOT physical.  He has the following concerns: feels well    No dizziness, syncope or fainting, exertional chest pain, excessive shortness of breath, focal weakness, abdominal pain, severe depressed mood, thoughts of suicide, recreational drug abuse, excessive alcohol usage. No excessive sleepiness in the day time falling asleep at the wheel or any motor vehicle accidents. No diabetes. No insulin or narcotic agents.       Patient Active Problem List   Diagnosis Code    History of prostate cancer Z85.46    Essential hypertension I10     Past Medical History:   Diagnosis Date    Hypertension      Past Surgical History:   Procedure Laterality Date    HX HERNIA REPAIR      HX PROSTATECTOMY       Social History     Socioeconomic History    Marital status:      Spouse name: Not on file    Number of children: 0    Years of education: Not on file    Highest education level: Not on file   Occupational History    Occupation: tractor trailer   Social Needs    Financial resource strain: Not on file    Food insecurity     Worry: Not on file     Inability: Not on file    Transportation needs     Medical: Not on file     Non-medical: Not on file   Tobacco Use    Smoking status: Former Smoker     Quit date: 1988     Years since quittin.9    Smokeless tobacco: Never Used   Substance and Sexual Activity    Alcohol use: No    Drug use: No    Sexual activity: Yes     Partners: Female   Lifestyle    Physical activity     Days per week: Not on file     Minutes per session: Not on file    Stress: Not on file   Relationships    Social connections     Talks on phone: Not on file     Gets together: Not on file     Attends Denominational service: Not on file     Active member of club or organization: Not on file     Attends meetings of clubs or organizations: Not on file     Relationship status: Not on file   Pratik Sage Intimate partner violence     Fear of current or ex partner: Not on file     Emotionally abused: Not on file     Physically abused: Not on file     Forced sexual activity: Not on file   Other Topics Concern    Not on file   Social History Narrative    Niece and mother lives with him       Current Outpatient Medications   Medication Sig Dispense Refill    verapamil (CALAN) 120 mg tablet Take 120 mg by mouth three (3) times daily. Results for orders placed or performed in visit on 02/23/21   AMB POC URINALYSIS DIP STICK AUTO W/O MICRO   Result Value Ref Range    Color (UA POC) Yellow     Clarity (UA POC) Clear     Glucose (UA POC) Negative Negative    Bilirubin (UA POC) Negative Negative    Ketones (UA POC) Negative Negative    Specific gravity (UA POC) 1.025 1.001 - 1.035    Blood (UA POC) 1+ Negative    pH (UA POC) 6.0 4.6 - 8.0    Protein (UA POC) Negative Negative    Urobilinogen (UA POC) 1 mg/dL 0.2 - 1    Nitrites (UA POC) Negative Negative    Leukocyte esterase (UA POC) Negative Negative         ROS: 12 point system revived,please see HPI for pertinent positives. OBJECTIVE:     Visit Vitals  /83 (BP 1 Location: Left arm, BP Patient Position: Sitting, BP Cuff Size: Large adult)   Pulse 82   Temp 97 °F (36.1 °C) (Temporal)   Resp 16   Ht 5' 7\" (1.702 m)   Wt 185 lb (83.9 kg)   SpO2 95%   BMI 28.98 kg/m²       Vision meet standards and hearing test good. Hearing Screening    125Hz 250Hz 500Hz 1000Hz 2000Hz 3000Hz 4000Hz 6000Hz 8000Hz   Right ear:            Left ear:            Comments: Hearing &gt;5 fee L/R ears     Visual Acuity Screening    Right eye Left eye Both eyes   Without correction:      With correction: 20/25 20/25 20/25          Physical Examination:    General appearance - alert, well appearing, and in no distress. HEENT  PERRLA, EOMI, Sclera clear, anicteric, Oropharynx clear, no lesions. Chest - RRR S1, S2 heard, no peripheral edema.      Lungs- Clear to auscultation, no wheezes, rales or rhonchi, has symmetric air entry    Neck- Supple, No adenopathy. Neuro- CN 2 to 11 grossly normal, No neuro deficits              DTR 2/4, strength on upper/lower ext 5/5 sohan    Abdomen/groin- ND,NT, No hernia    ROM: Good ROM of upper and lower extremities. Feet normal      Psy- Mood and Affect WNL      ASSESSMENT/PLAN       ICD-10-CM ICD-9-CM    1. Encounter for occupational physical examination  Z02.89 V70.5 AMB POC URINALYSIS DIP STICK AUTO W/O MICRO   2. Essential hypertension  I10 401.9      Orders Placed This Encounter    AMB POC URINALYSIS DIP STICK AUTO W/O MICRO         Healthy appearing person. Advised routine care with PCP. 1 yr DOT card with no restrictions given. DOT papers kept in file and [facility] web site updated. I have discussed the diagnosis with the patient and the intended plan as seen in the above orders. The patient has received an after-visit summary and questions were answered concerning future plans. I have discussed medication side effects and warnings with the patient as well. Pt verbalizes understanding.

## 2022-01-04 ENCOUNTER — APPOINTMENT (OUTPATIENT)
Dept: CT IMAGING | Age: 67
DRG: 066 | End: 2022-01-04
Attending: EMERGENCY MEDICINE
Payer: OTHER GOVERNMENT

## 2022-01-04 ENCOUNTER — HOSPITAL ENCOUNTER (INPATIENT)
Age: 67
LOS: 2 days | Discharge: HOME | DRG: 066 | End: 2022-01-07
Attending: EMERGENCY MEDICINE | Admitting: INTERNAL MEDICINE
Payer: OTHER GOVERNMENT

## 2022-01-04 DIAGNOSIS — I63.9 CEREBROVASCULAR ACCIDENT (CVA), UNSPECIFIED MECHANISM (HCC): Primary | ICD-10-CM

## 2022-01-04 LAB
ALBUMIN SERPL-MCNC: 3.8 G/DL (ref 3.5–5)
ALBUMIN/GLOB SERPL: 1 {RATIO} (ref 1.1–2.2)
ALP SERPL-CCNC: 59 U/L (ref 45–117)
ALT SERPL-CCNC: 24 U/L (ref 12–78)
ANION GAP SERPL CALC-SCNC: 11 MMOL/L (ref 5–15)
AST SERPL-CCNC: 20 U/L (ref 15–37)
BASOPHILS # BLD: 0 K/UL (ref 0–0.1)
BASOPHILS NFR BLD: 0 % (ref 0–1)
BILIRUB SERPL-MCNC: 0.5 MG/DL (ref 0.2–1)
BUN SERPL-MCNC: 23 MG/DL (ref 6–20)
BUN/CREAT SERPL: 20 (ref 12–20)
CALCIUM SERPL-MCNC: 9.4 MG/DL (ref 8.5–10.1)
CHLORIDE SERPL-SCNC: 106 MMOL/L (ref 97–108)
CO2 SERPL-SCNC: 25 MMOL/L (ref 21–32)
CREAT SERPL-MCNC: 1.16 MG/DL (ref 0.7–1.3)
DIFFERENTIAL METHOD BLD: ABNORMAL
EOSINOPHIL # BLD: 0.2 K/UL (ref 0–0.4)
EOSINOPHIL NFR BLD: 3 % (ref 0–7)
ERYTHROCYTE [DISTWIDTH] IN BLOOD BY AUTOMATED COUNT: 12.9 % (ref 11.5–14.5)
GLOBULIN SER CALC-MCNC: 3.8 G/DL (ref 2–4)
GLUCOSE BLD STRIP.AUTO-MCNC: 121 MG/DL (ref 65–117)
GLUCOSE SERPL-MCNC: 117 MG/DL (ref 65–100)
HCT VFR BLD AUTO: 40.1 % (ref 36.6–50.3)
HGB BLD-MCNC: 13 G/DL (ref 12.1–17)
IMM GRANULOCYTES # BLD AUTO: 0 K/UL (ref 0–0.04)
IMM GRANULOCYTES NFR BLD AUTO: 1 % (ref 0–0.5)
INR PPP: 1.1 (ref 0.9–1.1)
LYMPHOCYTES # BLD: 3.2 K/UL (ref 0.8–3.5)
LYMPHOCYTES NFR BLD: 52 % (ref 12–49)
MCH RBC QN AUTO: 27.3 PG (ref 26–34)
MCHC RBC AUTO-ENTMCNC: 32.4 G/DL (ref 30–36.5)
MCV RBC AUTO: 84.2 FL (ref 80–99)
MONOCYTES # BLD: 0.5 K/UL (ref 0–1)
MONOCYTES NFR BLD: 8 % (ref 5–13)
NEUTS SEG # BLD: 2.2 K/UL (ref 1.8–8)
NEUTS SEG NFR BLD: 36 % (ref 32–75)
NRBC # BLD: 0 K/UL (ref 0–0.01)
NRBC BLD-RTO: 0 PER 100 WBC
PLATELET # BLD AUTO: 331 K/UL (ref 150–400)
PMV BLD AUTO: 8.3 FL (ref 8.9–12.9)
POTASSIUM SERPL-SCNC: 4 MMOL/L (ref 3.5–5.1)
PROT SERPL-MCNC: 7.6 G/DL (ref 6.4–8.2)
PROTHROMBIN TIME: 10.5 SEC (ref 9–11.1)
RBC # BLD AUTO: 4.76 M/UL (ref 4.1–5.7)
SERVICE CMNT-IMP: ABNORMAL
SODIUM SERPL-SCNC: 142 MMOL/L (ref 136–145)
WBC # BLD AUTO: 6.2 K/UL (ref 4.1–11.1)

## 2022-01-04 PROCEDURE — 74011250637 HC RX REV CODE- 250/637: Performed by: INTERNAL MEDICINE

## 2022-01-04 PROCEDURE — 70450 CT HEAD/BRAIN W/O DYE: CPT

## 2022-01-04 PROCEDURE — 70496 CT ANGIOGRAPHY HEAD: CPT

## 2022-01-04 PROCEDURE — 80053 COMPREHEN METABOLIC PANEL: CPT

## 2022-01-04 PROCEDURE — 36415 COLL VENOUS BLD VENIPUNCTURE: CPT

## 2022-01-04 PROCEDURE — 74011000636 HC RX REV CODE- 636: Performed by: EMERGENCY MEDICINE

## 2022-01-04 PROCEDURE — 99285 EMERGENCY DEPT VISIT HI MDM: CPT

## 2022-01-04 PROCEDURE — 93005 ELECTROCARDIOGRAM TRACING: CPT

## 2022-01-04 PROCEDURE — G0378 HOSPITAL OBSERVATION PER HR: HCPCS

## 2022-01-04 PROCEDURE — 82962 GLUCOSE BLOOD TEST: CPT

## 2022-01-04 PROCEDURE — 85025 COMPLETE CBC W/AUTO DIFF WBC: CPT

## 2022-01-04 PROCEDURE — 74011250637 HC RX REV CODE- 250/637: Performed by: EMERGENCY MEDICINE

## 2022-01-04 PROCEDURE — 4A03X5D MEASUREMENT OF ARTERIAL FLOW, INTRACRANIAL, EXTERNAL APPROACH: ICD-10-PCS | Performed by: INTERNAL MEDICINE

## 2022-01-04 PROCEDURE — 94762 N-INVAS EAR/PLS OXIMTRY CONT: CPT

## 2022-01-04 PROCEDURE — 85610 PROTHROMBIN TIME: CPT

## 2022-01-04 PROCEDURE — 74011000250 HC RX REV CODE- 250: Performed by: INTERNAL MEDICINE

## 2022-01-04 RX ORDER — ENOXAPARIN SODIUM 100 MG/ML
40 INJECTION SUBCUTANEOUS DAILY
Status: DISCONTINUED | OUTPATIENT
Start: 2022-01-05 | End: 2022-01-07 | Stop reason: HOSPADM

## 2022-01-04 RX ORDER — VERAPAMIL HYDROCHLORIDE 120 MG/1
120 CAPSULE, EXTENDED RELEASE ORAL DAILY
COMMUNITY
End: 2022-01-07

## 2022-01-04 RX ORDER — ASPIRIN 325 MG
325 TABLET ORAL
Status: DISCONTINUED | OUTPATIENT
Start: 2022-01-04 | End: 2022-01-04

## 2022-01-04 RX ORDER — FAMOTIDINE 20 MG/1
20 TABLET, FILM COATED ORAL EVERY EVENING
Status: DISCONTINUED | OUTPATIENT
Start: 2022-01-04 | End: 2022-01-07 | Stop reason: HOSPADM

## 2022-01-04 RX ORDER — DEXTROMETHORPHAN HYDROBROMIDE, GUAIFENESIN 5; 100 MG/5ML; MG/5ML
1300 LIQUID ORAL EVERY 12 HOURS
COMMUNITY

## 2022-01-04 RX ORDER — POLYETHYLENE GLYCOL 3350 17 G/17G
17 POWDER, FOR SOLUTION ORAL DAILY PRN
Status: DISCONTINUED | OUTPATIENT
Start: 2022-01-04 | End: 2022-01-07 | Stop reason: HOSPADM

## 2022-01-04 RX ORDER — VERAPAMIL HYDROCHLORIDE 80 MG/1
80 TABLET ORAL ONCE
Status: COMPLETED | OUTPATIENT
Start: 2022-01-04 | End: 2022-01-04

## 2022-01-04 RX ORDER — ONDANSETRON 4 MG/1
4 TABLET, ORALLY DISINTEGRATING ORAL
Status: DISCONTINUED | OUTPATIENT
Start: 2022-01-04 | End: 2022-01-07 | Stop reason: HOSPADM

## 2022-01-04 RX ORDER — ONDANSETRON 2 MG/ML
4 INJECTION INTRAMUSCULAR; INTRAVENOUS
Status: DISCONTINUED | OUTPATIENT
Start: 2022-01-04 | End: 2022-01-07 | Stop reason: HOSPADM

## 2022-01-04 RX ORDER — DIPHENHYDRAMINE HCL 25 MG
25 TABLET ORAL 2 TIMES DAILY
COMMUNITY

## 2022-01-04 RX ORDER — CLOPIDOGREL BISULFATE 75 MG/1
300 TABLET ORAL
Status: COMPLETED | OUTPATIENT
Start: 2022-01-04 | End: 2022-01-04

## 2022-01-04 RX ORDER — VERAPAMIL HYDROCHLORIDE 80 MG/1
80 TABLET ORAL EVERY 8 HOURS
Status: DISCONTINUED | OUTPATIENT
Start: 2022-01-05 | End: 2022-01-06

## 2022-01-04 RX ORDER — SODIUM CHLORIDE 0.9 % (FLUSH) 0.9 %
5-40 SYRINGE (ML) INJECTION EVERY 8 HOURS
Status: DISCONTINUED | OUTPATIENT
Start: 2022-01-04 | End: 2022-01-07 | Stop reason: HOSPADM

## 2022-01-04 RX ORDER — SODIUM CHLORIDE 0.9 % (FLUSH) 0.9 %
5-40 SYRINGE (ML) INJECTION AS NEEDED
Status: DISCONTINUED | OUTPATIENT
Start: 2022-01-04 | End: 2022-01-07 | Stop reason: HOSPADM

## 2022-01-04 RX ADMIN — IOPAMIDOL 100 ML: 755 INJECTION, SOLUTION INTRAVENOUS at 12:23

## 2022-01-04 RX ADMIN — CLOPIDOGREL BISULFATE 300 MG: 75 TABLET ORAL at 13:36

## 2022-01-04 RX ADMIN — FAMOTIDINE 20 MG: 20 TABLET ORAL at 17:15

## 2022-01-04 RX ADMIN — VERAPAMIL HYDROCHLORIDE 80 MG: 80 TABLET, FILM COATED ORAL at 19:34

## 2022-01-04 RX ADMIN — SODIUM CHLORIDE, PRESERVATIVE FREE 10 ML: 5 INJECTION INTRAVENOUS at 22:00

## 2022-01-04 RX ADMIN — SODIUM CHLORIDE, PRESERVATIVE FREE 10 ML: 5 INJECTION INTRAVENOUS at 15:00

## 2022-01-04 NOTE — H&P
[facility]   Admission History & Physical        1/4/2022 2:53 PM  Patient: Singh Berry 1955  PCP: None    HISTORY  Chief Complaint:   Chief Complaint   Patient presents with    Dizziness       HPI: 77 y.o. male presenting for admission to [facility] for further evaluation and treatment for Stroke [facility]). He  has a past medical history of Hypertension. Alexi Tellez He presents to the ED following the onset of a dizzy feeling on awakening the day prior to admission. On Sunday night he had a restless night and did not sleep well. On awakening Monday morning he noted some balance problems with a tendency to drift to the left when ambulating. There was also some apparent weakness in the left lower extremityit would give away. He felt the weather was too bad to attempt coming into the hospital state at home. There was no significant change in his symptomatology over the following 24 hours. He has never had similar symptoms. He is self-employed cutting grass and doing yard work and feels like he has excellent balance without problems with falling etc.  He came into the ER this morning with the assistance of his wife. He has also had some complaints with pain in the ear for the past week but is not sought medical attention. He denies complaints of palpitation or known cardiac arrhythmia. There is no history of cardiac illness. He is treated for hypertension through the [place] taking verapamil 120 mg 3 times daily. He is fully vaccinated for Covid. He has a history of tobacco use but not for the past 20+ years. There is no history of diabetes or hyperlipidemia. There is no family history for vascular disease. He is was assessed with a CT head as well as a CTA of the head and neck. There was no evidence for intracerebral hemorrhage or stroke. There was no large vessel occlusion. Patient was seen by teleneurology and admission for stroke work-up was advised.   Concern for suspected lacunar infarct. Patient was given a loading dose of Plavix 300 mg as he has a prior history of aspirin allergy. Past Medical History:  Past Medical History:   Diagnosis Date    Hypertension        Past Surgical History:  Past Surgical History:   Procedure Laterality Date    HX HERNIA REPAIR      HX PROSTATECTOMY         Medication:  Prior to Admission medications    Medication Sig Start Date End Date Taking? Authorizing Provider   verapamil (CALAN) 120 mg tablet Take 120 mg by mouth three (3) times daily. Yes Provider, Historical       Allergies:   Allergies   Allergen Reactions    Asa-Acetaminophen-Caff-Buffers Unknown (comments)       Social History:  Social History     Tobacco Use    Smoking status: Former Smoker     Quit date: 1988     Years since quittin.7    Smokeless tobacco: Never Used   Vaping Use    Vaping Use: Never used   Substance Use Topics    Alcohol use: No    Drug use: No       Family History:  Family History   Problem Relation Age of Onset    Dementia Mother        ROS:  Total of 12 systems reviewed as follows:  POSITIVE= bolded text  Negative = text not bolded       General:  fever, chills, sweats, generalized weakness, weight loss/gain, loss of appetite   Eyes:    blurred vision, eye pain, loss of vision, double vision  ENT:    rhinorrhea, pharyngitis   Respiratory:  cough, sputum production, SOB, BAILEY, wheezing, pleuritic pain   Cardiology:   chest pain, palpitations, orthopnea, PND, edema, syncope   Gastrointestinal:  abdominal pain , N/V, diarrhea, dysphagia, constipation, bleeding   Genitourinary:  frequency, urgency, dysuria, hematuria, incontinence, prostatism   Muskuloskeletal: arthralgia, myalgia, back pain  Hematology:   easy bruising, nose or gum bleeding, lymphadenopathy   Dermatological: rash, ulceration, pruritis, color change / jaundice  Endocrine:   hot flashes or polydipsia   Neurological:  headache, dizziness, confusion, focal weakness, paresthesia, speech difficulties, memory loss, gait    difficulty  Psychological: feelings of anxiety, depression, agitation      PHYSICAL EXAM:  Patient Vitals for the past 24 hrs:   Temp Pulse Resp BP SpO2   01/04/22 1415 97.3 °F (36.3 °C) 68 18 (!) 173/101 99 %   01/04/22 1414  74      01/04/22 1401    (!) 168/93 100 %   01/04/22 1351     98 %   01/04/22 1346   19 (!) 167/99 99 %   01/04/22 1340     99 %   01/04/22 1317    (!) 162/100    01/04/22 1314     99 %   01/04/22 1301    (!) 145/93 98 %   01/04/22 1246    (!) 151/91 98 %   01/04/22 1238    (!) 164/92 99 %   01/04/22 1206    (!) 181/112 99 %   01/04/22 1201 98.4 °F (36.9 °C) 96 18 (!) 194/122 99 %       General:    Alert, cooperative, no distress, appears stated age. HEENT: Atraumatic, anicteric sclerae, pink conjunctivae     No oral ulcers, mucosa moist, throat clear, dentition fair  Neck:  Supple, symmetrical;   thyroid non tender  Lungs:   Clear to auscultation bilaterally. No wheezing or rhonchi. No rales. Chest wall:  No tenderness. No accessory muscle use. Heart:   Regular rhythm. No  murmur. No edema  Abdomen:   Soft, non-tender. Not distended. Bowel sounds normal  Extremities: No cyanosis. No clubbing      Capillary refill normal,  Radial pulse 2+,  DP 1+  Skin:     Not pale. Not jaundiced. No rashes   Psych:  Not depressed. Not anxious or agitated. Neurologic: EOMs intact. No facial asymmetry. No aphasia or slurred speech. Symmetrical strength UE w/o drift / normal . Pt has 4+/5 strength in L LE, slight proximal weakness when   compared to the R leg. Sensation grossly intact. Alert and oriented X 4.     Lab Data Reviewed:    Recent Results (from the past 24 hour(s))   GLUCOSE, POC    Collection Time: 01/04/22 12:01 PM   Result Value Ref Range    Glucose (POC) 121 (H) 65 - 117 mg/dL    Performed by Jayne Densno    CBC WITH AUTOMATED DIFF    Collection Time: 01/04/22 12:05 PM   Result Value Ref Range WBC 6.2 4.1 - 11.1 K/uL    RBC 4.76 4.10 - 5.70 M/uL    HGB 13.0 12.1 - 17.0 g/dL    HCT 40.1 36.6 - 50.3 %    MCV 84.2 80.0 - 99.0 FL    MCH 27.3 26.0 - 34.0 PG    MCHC 32.4 30.0 - 36.5 g/dL    RDW 12.9 11.5 - 14.5 %    PLATELET 734 508 - 959 K/uL    MPV 8.3 (L) 8.9 - 12.9 FL    NRBC 0.0 0  WBC    ABSOLUTE NRBC 0.00 0.00 - 0.01 K/uL    NEUTROPHILS 36 32 - 75 %    LYMPHOCYTES 52 (H) 12 - 49 %    MONOCYTES 8 5 - 13 %    EOSINOPHILS 3 0 - 7 %    BASOPHILS 0 0 - 1 %    IMMATURE GRANULOCYTES 1 (H) 0.0 - 0.5 %    ABS. NEUTROPHILS 2.2 1.8 - 8.0 K/UL    ABS. LYMPHOCYTES 3.2 0.8 - 3.5 K/UL    ABS. MONOCYTES 0.5 0.0 - 1.0 K/UL    ABS. EOSINOPHILS 0.2 0.0 - 0.4 K/UL    ABS. BASOPHILS 0.0 0.0 - 0.1 K/UL    ABS. IMM. GRANS. 0.0 0.00 - 0.04 K/UL    DF AUTOMATED     METABOLIC PANEL, COMPREHENSIVE    Collection Time: 01/04/22 12:05 PM   Result Value Ref Range    Sodium 142 136 - 145 mmol/L    Potassium 4.0 3.5 - 5.1 mmol/L    Chloride 106 97 - 108 mmol/L    CO2 25 21 - 32 mmol/L    Anion gap 11 5 - 15 mmol/L    Glucose 117 (H) 65 - 100 mg/dL    BUN 23 (H) 6 - 20 MG/DL    Creatinine 1.16 0.70 - 1.30 MG/DL    BUN/Creatinine ratio 20 12 - 20      GFR est AA >60 >60 ml/min/1.73m2    GFR est non-AA >60 >60 ml/min/1.73m2    Calcium 9.4 8.5 - 10.1 MG/DL    Bilirubin, total 0.5 0.2 - 1.0 MG/DL    ALT (SGPT) 24 12 - 78 U/L    AST (SGOT) 20 15 - 37 U/L    Alk.  phosphatase 59 45 - 117 U/L    Protein, total 7.6 6.4 - 8.2 g/dL    Albumin 3.8 3.5 - 5.0 g/dL    Globulin 3.8 2.0 - 4.0 g/dL    A-G Ratio 1.0 (L) 1.1 - 2.2     PROTHROMBIN TIME + INR    Collection Time: 01/04/22 12:05 PM   Result Value Ref Range    INR 1.1 0.9 - 1.1      Prothrombin time 10.5 9.0 - 11.1 sec   EKG, 12 LEAD, INITIAL    Collection Time: 01/04/22  1:16 PM   Result Value Ref Range    Ventricular Rate 73 BPM    Atrial Rate 73 BPM    P-R Interval 156 ms    QRS Duration 76 ms    Q-T Interval 374 ms    QTC Calculation (Bezet) 412 ms    Calculated P Axis 42 degrees Calculated R Axis -2 degrees    Calculated T Axis -49 degrees    Diagnosis       Normal sinus rhythm  Nonspecific T wave abnormality  Abnormal ECG  No previous ECGs available         EKG: NSR 73 bpm, NSTWC, No prev    Radiology:  CTA CODE NEURO HEAD AND NECK W CONT   Final Result   CTA Head:   1. No evidence of flow-limiting stenosis or aneurysm. Scattered atherosclerotic   disease as above. CTA Neck:   1. No evidence of significant stenosis. CT CODE NEURO HEAD WO CONTRAST   Final Result   1. No evidence of intracranial hemorrhage   2. Presence of bilateral maxillary sinus disease. This case was discussed with Dr. Denilson Brunson at 12:25 PM on 1/4/2022          Care Plan discussed with:   Patient x    Family     RN x         Consultant      Expected  Disposition:   Home with Family x   HH/PT/OT/RN    SNF/LTC    [facility]I      TOTAL TIME:  61 Minutes      Comments    x Reviewed previous records   >50% of visit spent in counseling and coordination of care x Discussion with patient and/or family and questions answered       _______________________________________________________  Given the patient's current clinical presentation, I have a high level of concern for decompensation if discharged from the emergency department. Complex decision making was performed, which includes reviewing the patient's available past medical records, laboratory results, and x-ray films. My assessment of this patient's clinical condition and my plan of care is as follows. ASSESSMENT / PLAN    Principal Problem:    Stroke [facility]) (1/4/2022)  Apparent R cerebral event with L sided findings  MRI / ECHO ordered  CT and CTA noted and reviewed by Tele-Neurology  To begin Plavix 75mg daily  Check Lipids, plan Lipitor high dose  PT/OT/ST consults  Discharge planning    Active Problems:    History of prostate cancer (3/12/2018)  Tx 2015. Radical prostatectomy with residual incontinence / wears depends.       Essential hypertension (3/12/2018)  Reduce Verapamil 60mg tid  Follow, longterm control with Sys below 140    SAFETY:   Code Status:Full  DVT prophylaxis:Lovenox  Stress Ulcer prophylaxis: Pepcid  Bladder catheter:no  Family Contact Info:  Primary Emergency Contact: [address], Home Phone: [phone]  Bedded: [facility] Room 118/01  Disposition: TBD, likely home when stable  Admission status:  Observation    -Tentative plan of care discussed with patient / family, who demonstrated understanding and is in agreement to the above  -Case was reviewed with the ED Provider, Mere Eaton, [address] [place] [address], MD  [facility] Hospitalist  [phone]

## 2022-01-04 NOTE — ED NOTES
TRANSFER - OUT REPORT:    Verbal report given to [facility]. RN on [facility]  being transferred to [place] [address] for routine progression of care       Report consisted of patients Situation, Background, Assessment and   Recommendations(SBAR). Information from the following report(s) SBAR, Kardex, ED Summary, MAR, Recent Results and Med Rec Status was reviewed with the receiving nurse. Lines:   Peripheral IV 01/04/22 Right Antecubital (Active)        Opportunity for questions and clarification was provided.       Patient transported with:   Registered Nurse

## 2022-01-04 NOTE — ED TRIAGE NOTES
Pt arrived by POV with feeling off balanced. Pt reports he started feeling bad yesterday evening and due to the weather he was not able to get to the hospital.  Pt noted with left facial droop and ataxia. Pt reports his dizziness is worse when he is standing and walking. Pt also reports he has a left ear infections which is untreated. Pt is awake alert and oriented X 4. Pt educated on ER flow.   Pt noted to be hypertensive and pt reports he took his medication this AM.

## 2022-01-04 NOTE — ED PROVIDER NOTES
EMERGENCY DEPARTMENT HISTORY AND PHYSICAL EXAM          Date: 2022  Patient Name: Cody Moyer    History of Presenting Illness     Chief Complaint   Patient presents with    Dizziness       History Provided By: Patient    HPI: Cody Moyer is a 77 y.o. male, pmhx listed below, who presents to the ED c/o difficulty balancing since yesterday. 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 States he could not make it to the hospital at that time due to snow and severe weather. Today when symptoms persisted, came to the emergency department for evaluation. No history of similar symptoms. Reports he has been having some ear pain and believes he has an ear infection over the last week but has not sought care for symptoms. PCP: None    There are no other complaints, changes, or physical findings at this time.          Past History       Past Medical History:  Past Medical History:   Diagnosis Date    Hypertension        Past Surgical History:  Past Surgical History:   Procedure Laterality Date    HX HERNIA REPAIR      HX PROSTATECTOMY         Family History:  Family History   Problem Relation Age of Onset    Dementia Mother        Social History:  Social History     Tobacco Use    Smoking status: Former Smoker     Quit date: 1988     Years since quittin.7    Smokeless tobacco: Never Used   Vaping Use    Vaping Use: Never used   Substance Use Topics    Alcohol use: No    Drug use: No       Current Facility-Administered Medications   Medication Dose Route Frequency Provider Last Rate Last Admin    verapamiL (CALAN) tablet 60 mg  60 mg Oral TID Jeanine Downey MD        sodium chloride (NS) flush 5-40 mL  5-40 mL IntraVENous Q8H Jeanine Downey MD        sodium chloride (NS) flush 5-40 mL  5-40 mL IntraVENous PRN Jeanine Downey MD        polyethylene glycol [facility]) packet 17 g  17 g Oral DAILY PRN Irena Rodriguez MD        ondansetron (ZOFRAN ODT) tablet 4 mg  4 mg Oral Q8H PRN Irena Rodriguez MD        Or    [facility] injection 4 mg  4 mg IntraVENous Q6H PRN Irena Rodriguez MD        [START ON 1/5/2022] enoxaparin (LOVENOX) injection 40 mg  40 mg SubCUTAneous DAILY Irena Rodriguez MD        famotidine (PEPCID) tablet 20 mg  20 mg Oral QPM Irena Rodriguez MD           Allergies: Allergies   Allergen Reactions    Asa-Acetaminophen-Caff-Buffers Unknown (comments)         Review of Systems   Review of Systems   Constitutional: Negative for chills and fever. HENT: Negative for ear pain. Eyes: Negative for pain. Respiratory: Negative for shortness of breath. Cardiovascular: Negative for chest pain. Gastrointestinal: Negative for abdominal pain. Genitourinary: Negative for flank pain. Musculoskeletal: Negative for back pain. Skin: Negative for rash. Neurological: Negative for headaches. Psychiatric/Behavioral: Negative for agitation. Physical Exam     Vital Signs-Reviewed the patient's vital signs. Patient Vitals for the past 12 hrs:   Temp Pulse Resp BP SpO2   01/04/22 1600  68      01/04/22 1415 97.3 °F (36.3 °C) 68 18 (!) 173/101 99 %   01/04/22 1414  74      01/04/22 1401    (!) 168/93 100 %   01/04/22 1351     98 %   01/04/22 1346   19 (!) 167/99 99 %   01/04/22 1340     99 %   01/04/22 1317    (!) 162/100    01/04/22 1314     99 %   01/04/22 1301    (!) 145/93 98 %   01/04/22 1246    (!) 151/91 98 %   01/04/22 1238    (!) 164/92 99 %   01/04/22 1206    (!) 181/112 99 %   01/04/22 1201 98.4 °F (36.9 °C) 96 18 (!) 194/122 99 %       Physical Exam  Vitals reviewed. HENT:      Head: Normocephalic and atraumatic. Mouth/Throat:      Mouth: Mucous membranes are moist.   Cardiovascular:      Rate and Rhythm: Normal rate and regular rhythm.    Pulmonary:      Effort: Pulmonary effort is normal.      Breath sounds: Normal breath sounds. Abdominal:      Palpations: Abdomen is soft. Tenderness: There is no abdominal tenderness. Musculoskeletal:         General: Normal range of motion. Cervical back: Normal range of motion. Skin:     General: Skin is warm and dry. Neurological:      Mental Status: He is alert and oriented to person, place, and time. Cranial Nerves: Facial asymmetry present. Motor: No weakness. Coordination: Finger-Nose-Finger Test abnormal and Heel to [place] yani Test abnormal.      Gait: Gait abnormal.      Comments: Left-sided facial droop. Mild difficulty with finger-to-nose on the left. Moderate difficulty with heel-to-shin on the left. Mild slurred speech. Psychiatric:         Mood and Affect: Mood normal.         Diagnostic Study Results     Labs -     Recent Results (from the past 12 hour(s))   GLUCOSE, POC    Collection Time: 22 12:01 PM   Result Value Ref Range    Glucose (POC) 121 (H) 65 - 117 mg/dL    Performed by Shemar Willsgar    CBC WITH AUTOMATED DIFF    Collection Time: 22 12:05 PM   Result Value Ref Range    WBC 6.2 4.1 - 11.1 K/uL    RBC 4.76 4.10 - 5.70 M/uL    HGB 13.0 12.1 - 17.0 g/dL    HCT 40.1 36.6 - 50.3 %    MCV 84.2 80.0 - 99.0 FL    MCH 27.3 26.0 - 34.0 PG    MCHC 32.4 30.0 - 36.5 g/dL    RDW 12.9 11.5 - 14.5 %    PLATELET 924 015 - 416 K/uL    MPV 8.3 (L) 8.9 - 12.9 FL    NRBC 0.0 0  WBC    ABSOLUTE NRBC 0.00 0.00 - 0.01 K/uL    NEUTROPHILS 36 32 - 75 %    LYMPHOCYTES 52 (H) 12 - 49 %    MONOCYTES 8 5 - 13 %    EOSINOPHILS 3 0 - 7 %    BASOPHILS 0 0 - 1 %    IMMATURE GRANULOCYTES 1 (H) 0.0 - 0.5 %    ABS. NEUTROPHILS 2.2 1.8 - 8.0 K/UL    ABS. LYMPHOCYTES 3.2 0.8 - 3.5 K/UL    ABS. MONOCYTES 0.5 0.0 - 1.0 K/UL    ABS. EOSINOPHILS 0.2 0.0 - 0.4 K/UL    ABS. BASOPHILS 0.0 0.0 - 0.1 K/UL    ABS. IMM.  GRANS. 0.0 0.00 - 0.04 K/UL    DF AUTOMATED     METABOLIC PANEL, COMPREHENSIVE    Collection Time: 22 12:05 PM   Result Value Ref Range    Sodium 142 136 - 145 mmol/L    Potassium 4.0 3.5 - 5.1 mmol/L    Chloride 106 97 - 108 mmol/L    CO2 25 21 - 32 mmol/L    Anion gap 11 5 - 15 mmol/L    Glucose 117 (H) 65 - 100 mg/dL    BUN 23 (H) 6 - 20 MG/DL    Creatinine 1.16 0.70 - 1.30 MG/DL    BUN/Creatinine ratio 20 12 - 20      GFR est AA >60 >60 ml/min/1.73m2    GFR est non-AA >60 >60 ml/min/1.73m2    Calcium 9.4 8.5 - 10.1 MG/DL    Bilirubin, total 0.5 0.2 - 1.0 MG/DL    ALT (SGPT) 24 12 - 78 U/L    AST (SGOT) 20 15 - 37 U/L    Alk. phosphatase 59 45 - 117 U/L    Protein, total 7.6 6.4 - 8.2 g/dL    Albumin 3.8 3.5 - 5.0 g/dL    Globulin 3.8 2.0 - 4.0 g/dL    A-G Ratio 1.0 (L) 1.1 - 2.2     PROTHROMBIN TIME + INR    Collection Time: 01/04/22 12:05 PM   Result Value Ref Range    INR 1.1 0.9 - 1.1      Prothrombin time 10.5 9.0 - 11.1 sec   EKG, 12 LEAD, INITIAL    Collection Time: 01/04/22  1:16 PM   Result Value Ref Range    Ventricular Rate 73 BPM    Atrial Rate 73 BPM    P-R Interval 156 ms    QRS Duration 76 ms    Q-T Interval 374 ms    QTC Calculation (Bezet) 412 ms    Calculated P Axis 42 degrees    Calculated R Axis -2 degrees    Calculated T Axis -49 degrees    Diagnosis       Normal sinus rhythm  Nonspecific T wave abnormality  Abnormal ECG  No previous ECGs available         Radiologic Studies -   CTA CODE NEURO HEAD AND NECK W CONT   Final Result   CTA Head:   1. No evidence of flow-limiting stenosis or aneurysm. Scattered atherosclerotic   disease as above. CTA Neck:   1. No evidence of significant stenosis. CT CODE NEURO HEAD WO CONTRAST   Final Result   1. No evidence of intracranial hemorrhage   2. Presence of bilateral maxillary sinus disease. This case was discussed with Dr. Rio Colón at 12:25 PM on 1/4/2022      MRI BRAIN WO CONT    (Results Pending)     CT Results  (Last 48 hours)               01/04/22 1220  CTA CODE NEURO HEAD AND NECK W CONT Final result    Impression:  CTA Head:   1.  No evidence of flow-limiting stenosis or aneurysm. Scattered atherosclerotic   disease as above. CTA Neck:   1. No evidence of significant stenosis. Narrative:  EXAM:  CTA CODE NEURO HEAD AND NECK W CONT       INDICATION:   Left-sided weakness. COMPARISON:  CT head 1/4/2022. CONTRAST:  100 mL of Isovue-370. TECHNIQUE:  Unenhanced  images were obtained to localize the volume for   acquisition. Multislice helical axial CT angiography was performed from the   aortic arch to the top of the head during uneventful rapid bolus intravenous   contrast administration. Coronal and sagittal reformations and 3D post   processing was performed. CT dose reduction was achieved through use of a   standardized protocol tailored for this examination and automatic exposure   control for dose modulation. This study was analyzed by the [address]  algorithm. FINDINGS:       CTA Head:   There is no evidence of large vessel occlusion or flow-limiting stenosis of the   intracranial internal carotid, anterior cerebral, and middle cerebral arteries. Calcification of the right carotid siphon with mild to moderate focal stenosis   in the cavernous right internal carotid artery (series 4 image 246). Calcification of the left carotid siphon with mild stenosis. The anterior   communicating artery is patent. There is no evidence of large vessel occlusion or flow-limiting stenosis of the   intracranial vertebral arteries, basilar artery, or posterior cerebral arteries. Mild focal stenoses in the bilateral P2 segments. The posterior communicating   arteries are diminutive but patent. There is no evidence of aneurysm or vascular malformation. The dural venous   sinuses and deep cerebral venous system are patent. No evidence of abnormal   enhancement on delayed phase images. CTA NECK:   NASCET method was utilized for calculating stenosis. The aortic arch is unremarkable.  The common carotid arteries demonstrate no   significant stenosis. Calcification in the proximal right internal carotid   artery without significant stenosis. Mild calcific atherosclerosis of the   proximal left internal carotid artery without significant stenosis. There is a dominant vertebrobasilar arterial system. The cervical vertebral   arteries are normal in course, size and contour without significant stenosis. Visualized soft tissues of the neck are unremarkable. Visualized lung apices are   clear. No acute fracture or aggressive osseous lesion. Multilevel degenerative   disc disease in the cervical spine most advanced at C5-C6.           01/04/22 1209  CT CODE NEURO HEAD WO CONTRAST Final result    Impression:  1. No evidence of intracranial hemorrhage   2. Presence of bilateral maxillary sinus disease. This case was discussed with Dr. Elaine Hitchcock at 12:25 PM on 1/4/2022       Narrative:  EXAM: CT CODE NEURO HEAD WO CONTRAST       INDICATION: Code Stroke       COMPARISON: None. CONTRAST: None. TECHNIQUE: Unenhanced CT of the head was performed using 5 mm images. Brain and   bone windows were generated. CT dose reduction was achieved through use of a   standardized protocol tailored for this examination and automatic exposure   control for dose modulation. FINDINGS:   No calvarial abnormalities are detected. Abnormal soft tissue density is noted   within the maxillary sinuses. Very mild deviation of the midportion of the nasal   septum towards the left is present. There is no evidence of intracranial   hemorrhage. CXR Results  (Last 48 hours)    None            EKG interpretation: (Preliminary)  Rhythm: Sinus, no ST elevation, normal intervals  This EKG was interpreted by ED Provider Jelena Robertson MD    Medical Decision Making   I am the first provider for this patient.     I reviewed the vital signs, available nursing notes, past medical history, past surgical history, family history and social history. Records Reviewed: Nursing Notes and Old Medical Records    Provider Notes (Medical Decision Making):   MDM: 59-year-old male with symptoms concerning for acute CVA. Onset of symptoms greater than 12 hours ago, not within window for TPA. We will plan for stat CT/CTA and neurology consult. Initial assessment performed. The patients presenting problems have been discussed, and they are in agreement with the care plan formulated and outlined with them. I have encouraged them to ask questions as they arise throughout their visit. PROGRESS NOTE:  ED Course as of 01/04/22 1705   Tue Jan 04, 2022   1243 Case discused with Dr. Carlos Mendez, Tele-Neurology. Pt will need MRI, PT/OT and stroke work-up. Due to aspirin allergy, will initiate plavix 300mg followed by 75mg daily. [PV]   4642 Case discussed with Dr. Александр Jha for admission for further work up. [PV]      ED Course User Index  [PV] Memo Keane MD          CRITICAL CARE NOTE :    5:06 PM      IMPENDING DETERIORATION - CNS    ASSOCIATED RISK FACTORS - CNS Decompensation    MANAGEMENT- Bedside Assessment    INTERPRETATION -  CT Scan    INTERVENTIONS - Neurologic interventions     CASE REVIEW - Hospitalist/Intensivist    TREATMENT RESPONSE -Stable    PERFORMED BY - Self        NOTES   :      I have spent 45 minutes of critical care time involved in lab review, consultations with specialist, family decision- making, bedside attention and documentation. During this entire length of time I was immediately available to the patient . Lore Dowd MD                                Diagnosis     Clinical Impression:   1. Cerebrovascular accident (CVA), unspecified mechanism ([address]            Disposition:  Admitted    Current Discharge Medication List            Please note, this dictation was completed with [employer], the [employer] voice recognition software.  Quite often unanticipated grammatical, syntax, homophones, and other interpretive errors are inadvertently transcribed by the computer software. Please disregard these errors. Please excuse any errors that have escaped final proof reading.

## 2022-01-04 NOTE — ROUTINE PROCESS
TRANSFER - IN REPORT:    Verbal report received from 16[address] Grab[address] Jackson RN(name) on Cody Moyer  being received from ED(unit) for routine progression of care      Report consisted of patients Situation, Background, Assessment and   Recommendations(SBAR). Information from the following report(s) SBAR and ED summary and recent results was reviewed with the receiving nurse. Opportunity for questions and clarification was provided. Assessment completed upon patients arrival to unit and care assumed.

## 2022-01-05 ENCOUNTER — APPOINTMENT (OUTPATIENT)
Dept: ULTRASOUND IMAGING | Age: 67
DRG: 066 | End: 2022-01-05
Attending: INTERNAL MEDICINE
Payer: OTHER GOVERNMENT

## 2022-01-05 ENCOUNTER — APPOINTMENT (OUTPATIENT)
Dept: MRI IMAGING | Age: 67
DRG: 066 | End: 2022-01-05
Attending: INTERNAL MEDICINE
Payer: OTHER GOVERNMENT

## 2022-01-05 PROBLEM — I63.9 CEREBROVASCULAR ACCIDENT (CVA) INVOLVING RIGHT CEREBRAL HEMISPHERE (HCC): Status: ACTIVE | Noted: 2022-01-05

## 2022-01-05 LAB
ANION GAP SERPL CALC-SCNC: 10 MMOL/L (ref 5–15)
BASOPHILS # BLD: 0 K/UL (ref 0–0.1)
BASOPHILS NFR BLD: 0 % (ref 0–1)
BUN SERPL-MCNC: 22 MG/DL (ref 6–20)
BUN/CREAT SERPL: 19 (ref 12–20)
CALCIUM SERPL-MCNC: 8.9 MG/DL (ref 8.5–10.1)
CHLORIDE SERPL-SCNC: 104 MMOL/L (ref 97–108)
CHOLEST SERPL-MCNC: 226 MG/DL
CO2 SERPL-SCNC: 25 MMOL/L (ref 21–32)
COMMENT, HOLDF: NORMAL
CREAT SERPL-MCNC: 1.14 MG/DL (ref 0.7–1.3)
DIFFERENTIAL METHOD BLD: ABNORMAL
ECHO AO ROOT DIAM: 3 CM
ECHO AO ROOT INDEX: 1.54 CM/M2
ECHO AR MAX VEL PISA: 3.5 M/S
ECHO AV PEAK GRADIENT: 7 MMHG
ECHO AV PEAK VELOCITY: 1.3 M/S
ECHO AV REGURGITANT PHT: 813 MILLISECOND
ECHO EST RA PRESSURE: 10 MMHG
ECHO LA DIAMETER INDEX: 1.95 CM/M2
ECHO LA DIAMETER: 3.8 CM
ECHO LA TO AORTIC ROOT RATIO: 1.27
ECHO LV E' SEPTAL VELOCITY: 5 CM/S
ECHO LV FRACTIONAL SHORTENING: 56 % (ref 28–44)
ECHO LV INTERNAL DIMENSION DIASTOLE INDEX: 2.46 CM/M2
ECHO LV INTERNAL DIMENSION DIASTOLIC: 4.8 CM (ref 4.2–5.9)
ECHO LV INTERNAL DIMENSION SYSTOLIC INDEX: 1.08 CM/M2
ECHO LV INTERNAL DIMENSION SYSTOLIC: 2.1 CM
ECHO LV IVSD: 1.2 CM (ref 0.6–1)
ECHO LV MASS 2D: 206.4 G (ref 88–224)
ECHO LV MASS INDEX 2D: 105.8 G/M2 (ref 49–115)
ECHO LV POSTERIOR WALL DIASTOLIC: 1.1 CM (ref 0.6–1)
ECHO LV RELATIVE WALL THICKNESS RATIO: 0.46
ECHO LVOT AREA: 3.5 CM2
ECHO LVOT DIAM: 2.1 CM
ECHO MV A VELOCITY: 0.94 M/S
ECHO MV E DECELERATION TIME (DT): 319.3 MS
ECHO MV E VELOCITY: 0.59 M/S
ECHO MV E/A RATIO: 0.63
ECHO MV E/E' SEPTAL: 11.8
ECHO RIGHT VENTRICULAR SYSTOLIC PRESSURE (RVSP): 28 MMHG
ECHO TV REGURGITANT MAX VELOCITY: 2.14 M/S
ECHO TV REGURGITANT PEAK GRADIENT: 18 MMHG
EOSINOPHIL # BLD: 0.2 K/UL (ref 0–0.4)
EOSINOPHIL NFR BLD: 4 % (ref 0–7)
ERYTHROCYTE [DISTWIDTH] IN BLOOD BY AUTOMATED COUNT: 12.9 % (ref 11.5–14.5)
GLUCOSE SERPL-MCNC: 93 MG/DL (ref 65–100)
HCT VFR BLD AUTO: 36.8 % (ref 36.6–50.3)
HDLC SERPL-MCNC: 38 MG/DL
HDLC SERPL: 5.9 {RATIO} (ref 0–5)
HGB BLD-MCNC: 12.1 G/DL (ref 12.1–17)
IMM GRANULOCYTES # BLD AUTO: 0 K/UL (ref 0–0.04)
IMM GRANULOCYTES NFR BLD AUTO: 1 % (ref 0–0.5)
LDLC SERPL CALC-MCNC: 135.4 MG/DL (ref 0–100)
LYMPHOCYTES # BLD: 2.6 K/UL (ref 0.8–3.5)
LYMPHOCYTES NFR BLD: 50 % (ref 12–49)
MAGNESIUM SERPL-MCNC: 2.2 MG/DL (ref 1.6–2.4)
MCH RBC QN AUTO: 27.4 PG (ref 26–34)
MCHC RBC AUTO-ENTMCNC: 32.9 G/DL (ref 30–36.5)
MCV RBC AUTO: 83.4 FL (ref 80–99)
MONOCYTES # BLD: 0.6 K/UL (ref 0–1)
MONOCYTES NFR BLD: 11 % (ref 5–13)
NEUTS SEG # BLD: 1.7 K/UL (ref 1.8–8)
NEUTS SEG NFR BLD: 34 % (ref 32–75)
NRBC # BLD: 0 K/UL (ref 0–0.01)
NRBC BLD-RTO: 0 PER 100 WBC
PLATELET # BLD AUTO: 307 K/UL (ref 150–400)
PMV BLD AUTO: 8.7 FL (ref 8.9–12.9)
POTASSIUM SERPL-SCNC: 3.8 MMOL/L (ref 3.5–5.1)
RBC # BLD AUTO: 4.41 M/UL (ref 4.1–5.7)
SAMPLES BEING HELD,HOLD: NORMAL
SODIUM SERPL-SCNC: 139 MMOL/L (ref 136–145)
TRIGL SERPL-MCNC: 263 MG/DL (ref ?–150)
VLDLC SERPL CALC-MCNC: 52.6 MG/DL
WBC # BLD AUTO: 5.1 K/UL (ref 4.1–11.1)

## 2022-01-05 PROCEDURE — 96372 THER/PROPH/DIAG INJ SC/IM: CPT

## 2022-01-05 PROCEDURE — 74011000250 HC RX REV CODE- 250: Performed by: INTERNAL MEDICINE

## 2022-01-05 PROCEDURE — 74011250636 HC RX REV CODE- 250/636: Performed by: INTERNAL MEDICINE

## 2022-01-05 PROCEDURE — 93306 TTE W/DOPPLER COMPLETE: CPT

## 2022-01-05 PROCEDURE — 93306 TTE W/DOPPLER COMPLETE: CPT | Performed by: INTERNAL MEDICINE

## 2022-01-05 PROCEDURE — 97161 PT EVAL LOW COMPLEX 20 MIN: CPT

## 2022-01-05 PROCEDURE — 94760 N-INVAS EAR/PLS OXIMETRY 1: CPT

## 2022-01-05 PROCEDURE — 80061 LIPID PANEL: CPT

## 2022-01-05 PROCEDURE — G0378 HOSPITAL OBSERVATION PER HR: HCPCS

## 2022-01-05 PROCEDURE — 70551 MRI BRAIN STEM W/O DYE: CPT

## 2022-01-05 PROCEDURE — 36415 COLL VENOUS BLD VENIPUNCTURE: CPT

## 2022-01-05 PROCEDURE — 74011250637 HC RX REV CODE- 250/637: Performed by: INTERNAL MEDICINE

## 2022-01-05 PROCEDURE — 97165 OT EVAL LOW COMPLEX 30 MIN: CPT

## 2022-01-05 PROCEDURE — 92610 EVALUATE SWALLOWING FUNCTION: CPT

## 2022-01-05 PROCEDURE — 97535 SELF CARE MNGMENT TRAINING: CPT

## 2022-01-05 PROCEDURE — 85025 COMPLETE CBC W/AUTO DIFF WBC: CPT

## 2022-01-05 PROCEDURE — 83735 ASSAY OF MAGNESIUM: CPT

## 2022-01-05 PROCEDURE — 65270000029 HC RM PRIVATE

## 2022-01-05 PROCEDURE — 80048 BASIC METABOLIC PNL TOTAL CA: CPT

## 2022-01-05 RX ORDER — ATORVASTATIN CALCIUM 40 MG/1
40 TABLET, FILM COATED ORAL
Status: DISCONTINUED | OUTPATIENT
Start: 2022-01-05 | End: 2022-01-07 | Stop reason: HOSPADM

## 2022-01-05 RX ORDER — CLOPIDOGREL BISULFATE 75 MG/1
75 TABLET ORAL DAILY
Status: DISCONTINUED | OUTPATIENT
Start: 2022-01-05 | End: 2022-01-07 | Stop reason: HOSPADM

## 2022-01-05 RX ADMIN — ENOXAPARIN SODIUM 40 MG: 100 INJECTION SUBCUTANEOUS at 09:29

## 2022-01-05 RX ADMIN — SODIUM CHLORIDE, PRESERVATIVE FREE 10 ML: 5 INJECTION INTRAVENOUS at 21:35

## 2022-01-05 RX ADMIN — CLOPIDOGREL BISULFATE 75 MG: 75 TABLET ORAL at 12:29

## 2022-01-05 RX ADMIN — SODIUM CHLORIDE, PRESERVATIVE FREE 10 ML: 5 INJECTION INTRAVENOUS at 17:18

## 2022-01-05 RX ADMIN — VERAPAMIL HYDROCHLORIDE 80 MG: 80 TABLET, FILM COATED ORAL at 14:25

## 2022-01-05 RX ADMIN — VERAPAMIL HYDROCHLORIDE 80 MG: 80 TABLET, FILM COATED ORAL at 06:07

## 2022-01-05 RX ADMIN — SODIUM CHLORIDE, PRESERVATIVE FREE 10 ML: 5 INJECTION INTRAVENOUS at 06:07

## 2022-01-05 RX ADMIN — VERAPAMIL HYDROCHLORIDE 80 MG: 80 TABLET, FILM COATED ORAL at 21:35

## 2022-01-05 RX ADMIN — ATORVASTATIN CALCIUM 40 MG: 40 TABLET, FILM COATED ORAL at 17:18

## 2022-01-05 RX ADMIN — FAMOTIDINE 20 MG: 20 TABLET ORAL at 17:18

## 2022-01-05 NOTE — PROGRESS NOTES
Bedside shift change report given to Yolanda (oncoming nurse) by Harsh Ventura (offgoing nurse). Report included the following information SBAR, Kardex and MAR.

## 2022-01-05 NOTE — PROGRESS NOTES
Bedside and Verbal shift change report given to SONY Arango rN (oncoming nurse) by Francis Resendiz RN   (offgoing nurse). Report included the following information SBAR, Kardex, Procedure Summary, Intake/Output, MAR, Accordion, Recent Results, Med Rec Status, Cardiac Rhythm SR and Alarm Parameters . Young score 1  Bed/chair alarm yes in use. If in use it is set at the highest volume. Intravenous fluids were administered, none   Patient Vitals for the past 12 hrs:   Temp Pulse Resp BP SpO2   01/04/22 1941 97.7 °F (36.5 °C) 70 18  98 %   01/04/22 1934  68  (!) 150/90    01/04/22 1600 96.9 °F (36.1 °C) 70 18 (!) 179/114 99 %   01/04/22 1415 97.3 °F (36.3 °C) 68 18 (!) 173/101 99 %   01/04/22 1414  74      01/04/22 1401    (!) 168/93 100 %   01/04/22 1351     98 %   01/04/22 1346   19 (!) 167/99 99 %   01/04/22 1340     99 %   01/04/22 1317    (!) 162/100    01/04/22 1314     99 %   01/04/22 1301    (!) 145/93 98 %   01/04/22 1246    (!) 151/91 98 %   01/04/22 1238    (!) 164/92 99 %   01/04/22 1206    (!) 181/112 99 %   01/04/22 1201 98.4 °F (36.9 °C) 96 18 (!) 194/122 99 %     No flowsheet data found. Lab results reviewed. For significant abnormal values and values requiring intervention, see assessment and plan.

## 2022-01-05 NOTE — PROGRESS NOTES
Mr. Charito Beavers has Medicare part A. [facility] I called the [address] I notified them that Mr. Yuan Jimenez is here in the hospital. I completed Consent and Transfer Request form/documentation and faxed it to them along with the face sheet and progress notes. Fax number [phone]. When I was speaking to Mr. Charito Beavers he noted his only income is Social Security - $551 per month. He is likely eligible for Medicaid and possibly SSI to supplement his Social Security. I noted he could call me when he gets home for assistance accessing benefits he may be eligible for. He has the contact numbers for care management.

## 2022-01-05 NOTE — PROGRESS NOTES
Problem: Falls - Risk of  Goal: *Absence of Falls  Description: Document Mariana Arroyo Fall Risk and appropriate interventions in the flowsheet.   Outcome: Progressing Towards Goal  Note: Fall Risk Interventions:            Medication Interventions: Patient to call before getting OOB                   Problem: Hypertension  Goal: *Blood pressure within specified parameters  Outcome: Progressing Towards Goal  Goal: *Labs within defined limits  Outcome: Progressing Towards Goal     Problem: TIA/CVA Stroke: 0-24 hours  Goal: Activity/Safety  Outcome: Progressing Towards Goal  Goal: Nutrition/Diet  Outcome: Progressing Towards Goal  Goal: Psychosocial  Outcome: Progressing Towards Goal  Goal: *Hemodynamically stable  Outcome: Progressing Towards Goal  Goal: *Neurologically stable  Description: Absence of additional neurological deficits    Outcome: Progressing Towards Goal  Goal: *Stroke education started(Stroke Metric)  Outcome: Progressing Towards Goal  Note: Pt has booklet in room, discussed MRI for today  Goal: *Rehab consulted(Stroke Metric)  Outcome: Progressing Towards Goal

## 2022-01-05 NOTE — PROGRESS NOTES
Problem: Falls - Risk of  Goal: *Absence of Falls  Description: Document Evangelina Kimball Fall Risk and appropriate interventions in the flowsheet.   Outcome: Progressing Towards Goal  Note: Fall Risk Interventions:            Medication Interventions: Patient to call before getting OOB                   Problem: Hypertension  Goal: *Blood pressure within specified parameters  Outcome: Progressing Towards Goal  Goal: *Fluid volume balance  Outcome: Progressing Towards Goal  Goal: *Labs within defined limits  Outcome: Progressing Towards Goal     Problem: TIA/CVA Stroke: 0-24 hours  Goal: Off Pathway (Use only if patient is Off Pathway)  Outcome: Progressing Towards Goal  Goal: Diagnostic Test/Procedures  Outcome: Progressing Towards Goal  Goal: Nutrition/Diet  Outcome: Progressing Towards Goal  Goal: *Neurologically stable  Description: Absence of additional neurological deficits    Outcome: Progressing Towards Goal     Problem: TIA/CVA Stroke: Day 2 Until Discharge  Goal: Off Pathway (Use only if patient is Off Pathway)  Outcome: Progressing Towards Goal

## 2022-01-05 NOTE — PROGRESS NOTES
Problem: Self Care Deficits Care Plan (Adult)  Goal: *Acute Goals and Plan of Care (Insert Text)  Description: FUNCTIONAL STATUS PRIOR TO ADMISSION: Patient was independent and active without use of DME.     HOME SUPPORT: The patient lived with girlfriend and family but did not require assist.    Occupational Therapy Goals  Initiated 1/5/2022  1. Patient will perform bathing with supervision/set-up within 7 day(s). 2.  Patient will perform lower body dressing with supervision/set-up within 7 day(s). 3.  Patient will perform shower transfer with modified independence within 7 day(s). 4.  Patient will perform toilet transfers with modified independence within 7 day(s). 5.  Patient will perform all aspects of toileting with modified independence within 7 day(s). 6.  Patient will participate in upper extremity therapeutic exercise/activities with supervision/set-up for 8 minutes within 7 day(s). 7.  Patient will utilize energy conservation techniques during functional activities with verbal cues within 7 day(s). Outcome: Progressing Towards Goal   OCCUPATIONAL THERAPY EVALUATION  Patient: Lonnie Ty (44 y.o. male)  Date: 1/5/2022  Primary Diagnosis: Stroke [facility]) [I63.9]        Precautions: fall risk      ASSESSMENT  Based on the objective data described below, the patient presents with CVA. Acute infarct in the right paramedian radha identified on MRI today. He was working and independent in all tasks without mobility aid. Today OT started assessment prior to CM mtg then went back with PT to finish. He was still sitting in chair since morning and BP taken in sitting. He was SBA/Supervision to stand and it was taken again but shot up to diastolic above 438. He was reseated and recovered, admitting he had a slight headache. He also complained of L ear pain and OTR recommended he remove his blue tooth from L ear. Noted more diffiicutly using the L hand to put in his R ear due to coordination difficulty. He has decreased proprioception in LUE and difficulty mimicking UE body movements with eyes closed. He has full AROM but is slightly weaker in L shoulder compared to R shoulder. He was able to perform seated ADL with set-up. Current Level of Function Impacting Discharge (ADLs/self-care): set-up for seated adl, min assist gown. Did not perform tung-area care due to BP elevating when in standing. He was unable to ambulate due to BP. OTR recommended BSC use and only transfers bed to chair or BSC until BP under control. Pt had slight headache in standing which improved in sitting. Nurse notified. Functional Outcome Measure: The patient scored Total: 55/100 on the Barthel Index outcome measure which is indicative of being moderately dependent in basic self-care. Other factors to consider for discharge: lives with others     Patient will benefit from skilled therapy intervention to address the above noted impairments. PLAN :  Recommendations and Planned Interventions: self care training, functional mobility training, therapeutic exercise, therapeutic activities, endurance activities and patient education    Frequency/Duration: Patient will be followed by occupational therapy 3 times a week to address goals. Recommendation for discharge: (in order for the patient to meet his/her long term goals)  Occupational therapy at least 2 days/week in the home AND ensure assist and/or supervision for safety with ambulation once bp under control    This discharge recommendation:  Has not yet been discussed the attending provider and/or case management    IF patient discharges home will need the following DME: bedside commode       SUBJECTIVE:   Patient stated [facility] ear has been hurting for awhile.     OBJECTIVE DATA SUMMARY:   HISTORY:   Past Medical History:   Diagnosis Date    Hypertension      Past Surgical History:   Procedure Laterality Date    HX HERNIA REPAIR      HX PROSTATECTOMY  2015       Expanded or extensive additional review of patient history:     Home Situation  Home Environment: Private residence  # Steps to Enter: 5  Rails to Enter: Yes  One/Two Story Residence: One story  Living Alone: No  Support Systems: Spouse/Significant Other,Other Family Member(s)  Patient Expects to be Discharged to[de-identified] House  Current DME Used/Available at Home: None  Tub or Shower Type: Shower    Hand dominance: Right    EXAMINATION OF PERFORMANCE DEFICITS:  Cognitive/Behavioral Status:  Neurologic State: Alert  Orientation Level: Oriented X4  Cognition: Appropriate for age attention/concentration; Follows commands  Perception: Appears intact  Perseveration: No perseveration noted  Safety/Judgement: Decreased awareness of need for assistance;Decreased awareness of need for safety    Skin: intact    Edema: not visualized    Hearing: Auditory  Auditory Impairment: None    Vision/Perceptual:    Tracking: Able to track stimulus in all quadrants w/o difficulty           Range of Motion:  WFL BUE    Strength:  Elbows 5/5 bilaterally; R shoulder 5/5; L shoulder 4/5    Coordination:     Fine Motor Skills-Upper: Left Impaired;Right Intact    Gross Motor Skills-Upper: Left Intact; Right Intact  Difficulty touching one finger to the next L hand unless using eyes  Proprioception impaired, difficulty mimicking R hand/arm postures with L when eyes closed    Tone & Sensation:  Normal tone and denies numbness/tingling    Balance:  Sitting: Intact    Functional Mobility and Transfers for ADLs:  Bed Mobility:       Transfers:  Sit to Stand: Supervision  Stand to Sit: Supervision    ADL Assessment:  Feeding: Independent    Oral Facial Hygiene/Grooming: Setup    Bathing: Setup    Upper Body Dressing: Independent;Setup    Lower Body Dressing: Independent;Setup                     ADL Intervention and task modifications:  Feeding  Feeding Assistance: Independent    Grooming  Grooming Assistance: Set-up  Position Performed: Seated in chair  Brushing Teeth: Set-up    Upper Body Bathing  Bathing Assistance: Set-up  Position Performed: Seated in chair    Lower Body Bathing  Lower Body : Set-up  Position Performed: Seated in chair    Upper [place] [address] Gown: Minimum  assistance      Cognitive Retraining  Following Commands: Follows one step commands/directions  Safety/Judgement: Decreased awareness of need for assistance;Decreased awareness of need for safety      Functional Measure:  Fugl-Silva Assessment of Motor Recovery after Stroke:     Reflex Activity  Flexors/Biceps/Fingers: Can be elicited  Extensors/Triceps: Can be elicited  Reflex Subtotal: 4    Volitional Movement Within Synergies  Shoulder Retraction: Full  Shoulder Elevation: Full  Shoulder Abduction (90 degrees): Full  Shoulder External Rotation: Full  Elbow Flexion: Full  Forearm Supination: Full  Shoulder Adduction/Internal Rotation: Full  Elbow Extension: Full  Forearm Pronation: Full  Subtotal: 18    Volitional Movement Mixing Synergies  Hand to Lumbar Spine: Full  Shoulder Flexion (0-90 degrees): Full  Pronation-Supination: Full  Subtotal: 6    Volitional Movement With Little or No Synergy  Shoulder Abduction (0-90 degrees): Full  Shoulder Flexion ( degrees): Full  Pronation/Supination: Full  Subtotal : 6    Normal Reflex Activity  Biceps, Triceps, Finger Flexors:  Full  Subtotal : 2    Upper Extremity Total   Upper Extremity Total: 36    Wrist  Stability at 15 Degree Dorsiflexion: Full  Repeated Dorsiflexion/ Volar Flexion: Full  Stability at 15 Degree Dorsiflexion: Full  Repeated Dorsiflexion/ Volar Flexion: Full  Circumduction: Full  Wrist Total: 10    Hand  Mass Flexion: Full  Mass Extension: Full  Grasp A: Full  Grasp B: Full  Grasp C: Full  Grasp D: Full  Grasp E: Full  Hand Total: 14    Coordination/Speed  Tremor: None  Dysmetria: Slight  Time: 2-5s  Coordination/Speed Total : 4    Total A-D  Total A-D (Motor Function): 64/66         This is a reliable/valid measure of arm function after a neurological event. It has established value to characterize functional status and for measuring spontaneous and therapy-induced recovery; tests proximal and distal motor functions. Fugl-Silva Assessment  UE scores recorded between five and 30 days post neurologic event can be used to predict UE recovery at six months post neurologic event. Severe = 0-21 points   Moderately Severe = 22-33 points   Moderate = 34-47 points   Mild = 48-66 points  MEHRAN Addison, KATJA Saeed, & HANK Cyr (1992). Measurement of motor recovery after stroke: Outcome assessment and sample size requirements. Stroke, 23, pp. 7128-3835.   --------------------------------------------------------------------------------------------------------------------------------------------------------------------  MCID:  Stroke:   Ysabel Silverio et al, 2001; n = 171; mean age 79 (6) years; assessed within 16 (12) days of stroke, Acute Stroke)  FMA Motor Scores from Admission to Discharge   10 point increase in FMA Upper Extremity = 1.5 change in discharge FIM   10 point increase in FMA Lower Extremity = 1.9 change in discharge FIM  MDC:   Stroke:   Christian Sanz et al, 2008, n = 14, mean age = 59.9 (14.6) years, assessed on average 14 (6.5) months post stroke, Chronic Stroke)   FMA = 5.2 points for the Upper Extremity portion of the assessment       Barthel Index:  Bathin  Bladder: 10  Bowels: 10  Groomin  Dressin  Feeding: 10  Mobility: 0  Stairs: 0  Toilet Use: 5  Transfer (Bed to Chair and Back): 10  Total: 55/100      The Barthel ADL Index: Guidelines  1. The index should be used as a record of what a patient does, not as a record of what a patient could do. 2. The main aim is to establish degree of independence from any help, physical or verbal, however minor and for whatever reason. 3. The need for supervision renders the patient not independent.   4. A patient's performance should be established using the best available evidence. Asking the patient, friends/relatives and nurses are the usual sources, but direct observation and common sense are also important. However direct testing is not needed. 5. Usually the patient's performance over the preceding 24-48 hours is important, but occasionally longer periods will be relevant. 6. Middle categories imply that the patient supplies over 50 per cent of the effort. 7. Use of aids to be independent is allowed. Score Interpretation (from [address] [facility] [address])    Independent   60-79 Minimally independent   40-59 Partially dependent   20-39 Very dependent   <20 Totally dependent     -Amy Rico., Barthel, D.W. (1965). Functional evaluation: the Barthel Index. [address] ([address] [place] [address] (1997). The Barthel activities of daily living index: self-reporting versus actual performance in the old (> or = 75 years). Journal of [address] [place] [address] 45(7), [address] [facility], BLAKE, [place] Bridges., [place]. (1999). Measuring the change in disability after inpatient rehabilitation; comparison of the responsiveness of the Barthel Index and Functional [place] Measure. Journal of Neurology, Neurosurgery, and Psychiatry, 66(4), 098-136. Adrien Walker, N.J.A, NARAYAN Gardner, & Delfina Hamilton M.A. (2004) Assessment of post-stroke quality of life in cost-effectiveness studies: The usefulness of the Barthel Index and the EuroQoL-5D.  Quality of Life Research, 15, 553-24     Occupational Therapy Evaluation Charge Determination   History Examination Decision-Making   MEDIUM Complexity : Expanded review of history including physical, cognitive and psychosocial  history  LOW Complexity : 1-3 performance deficits relating to physical, cognitive , or psychosocial skils that result in activity limitations and / or participation restrictions  LOW Complexity : No comorbidities that affect functional and no verbal or physical assistance needed to complete eval tasks       Based on the above components, the patient evaluation is determined to be of the following complexity level: LOW   Pain Rating:  Pain in L ear reported and relayed to nurse    Activity Tolerance:   Poor and BP elevates in standing     After treatment patient left in no apparent distress:    Sitting in chair, Call bell within reach and Bed / chair alarm activated    COMMUNICATION/EDUCATION:   The patients plan of care was discussed with: Physical therapist and Registered nurse. Patient/family have participated as able in goal setting and plan of care. This patients plan of care is appropriate for delegation to CAMILLA.     Thank you for this referral.  Parul Leal OT  Time Calculation: 31 mins

## 2022-01-05 NOTE — PROGRESS NOTES
Follow up visit with patient in Rm 118  Provided empathic listening and spiritual support  [address] [facility] Availability

## 2022-01-05 NOTE — PROGRESS NOTES
PHYSICAL THERAPY EVALUATION  Patient: Theodora Acosta (36 y.o. male)  Date: 1/5/2022  Primary Diagnosis: Stroke [facility]) [I63.9]  Cerebrovascular accident (CVA) involving right cerebral hemisphere [facility]) [I63.9]       Precautions: Fall       ASSESSMENT  Pt. Received upright and agreeable to PT. Pt. Demonstrates L sided LE muscular weakness with no sensation differences. PT unable to complete full assessment of proprioception/balance d/t increased hypertension >100 DBP in sitting/standing. Active therapy activities on hold until BP more stable. Pt was supervision without an assistive device with sit <>stand from chair with minimal cueing needed. Pt. Left with all needs met and nursing hand off made. Current Level of Function Impacting Discharge (mobility/balance): functional endurance with ambulation/dynamic balance     Other factors to consider for discharge: home set up     Patient will benefit from skilled therapy intervention to address the above noted impairments. PLAN :  Recommendations and Planned Interventions: transfer training, gait training, therapeutic exercises, neuromuscular re-education, patient and family training/education and therapeutic activities      Frequency/Duration: Patient will be followed by physical therapy:  4-6 times a week to address goals. Recommendation for discharge: (in order for the patient to meet his/her long term goals)  To be determined: TBD    This discharge recommendation:  Has been made in collaboration with the attending provider and/or case management    IF patient discharges home will need the following DME: to be determined (TBD)         SUBJECTIVE:   Patient stated I have tara a headache.     OBJECTIVE DATA SUMMARY:   HISTORY:    Past Medical History:   Diagnosis Date    Hypertension      Past Surgical History:   Procedure Laterality Date    HX HERNIA REPAIR      HX PROSTATECTOMY  2015       Home Situation  Home Environment: Private residence  # Steps to Enter: 5  Rails to [facility]: Yes  One/Two Story Residence: One story  Living Alone: No  Support Systems: Spouse/Significant Other,Other Family Member(s)  Patient Expects to be Discharged to[de-identified] House  Current DME Used/Available at Home: None  Tub or Shower Type: Shower    EXAMINATION/PRESENTATION/DECISION MAKING:   Critical Behavior:  Neurologic State: Alert  Orientation Level: Oriented X4  Cognition: Appropriate decision making  Safety/Judgement: Decreased awareness of need for assistance,Decreased awareness of need for safety  Hearing: Auditory  Auditory Impairment: None  Vision:   Tracking: Able to track stimulus in all quadrants w/o difficulty  Functional Mobility:  Transfers:  Sit to Stand: Supervision  Stand to Sit: Supervision  Balance:   Sitting: Intact     Physical Therapy Evaluation Charge Determination   History Examination Presentation Decision-Making   LOW Complexity : Zero comorbidities / personal factors that will impact the outcome / POC LOW Complexity : 1-2 Standardized tests and measures addressing body structure, function, activity limitation and / or participation in recreation  LOW Complexity : Stable, uncomplicated  LOW Complexity : FOTO score of       Based on the above components, the patient evaluation is determined to be of the following complexity level: LOW     Pain Ratin/10    Activity Tolerance:   Good    After treatment patient left in no apparent distress:   Sitting in chair    COMMUNICATION/EDUCATION:   The patients plan of care was discussed with: Physical therapist and Occupational therapist.     Patient/family have participated as able in goal setting and plan of care.     Thank you for this referral.  Brayan Moser, PT, DPT, EP-C   Time Calculation: 13 mins

## 2022-01-05 NOTE — PROGRESS NOTES
Problem: Dysphagia (Adult)  Goal: *Acute Goals and Plan of Care (Insert Text)  Description: Speech Pathology Goals  Initiated 1/5/2022    1. Patient will tolerate Regular/Thin Liquid without signs of aspiration within 7 days. Outcome: Progressing Towards Goal     SPEECH LANGUAGE PATHOLOGY BEDSIDE SWALLOW EVALUATION  Patient: Diana Baeza (76 y.o. male)  Date: 1/5/2022  Primary Diagnosis: Stroke [facility]) [I63.9]        Precautions:      ASSESSMENT :  Based on the objective data described below, the patient presents with a functional oropharyngeal swallow at bedside. MRI on 1/5 revealed \"acute infarct in the right paramedian radha. \" On this date, patient tolerated all consistencies without difficulty and without signs of aspiration. Per patient report, patient with motor speech difficulties at the time of admission, which have now resolved. Patient intelligible at the word, phrase, sentence, and conversation level on this date. Patient denied any decline in language or cognitive function. Given patient's acute R pontine infarct, patient is at risk for aspiration. Due to patient's functional oral/pharyngeal swallow, recommend Regular/Thin Liquid with general aspiration precautions outlined below. RN educated on SLP evaluation. Patient will benefit from skilled intervention to address the above impairments. Patients rehabilitation potential is considered to be good. PLAN :  Recommendations and Planned Interventions:  -- Regular/Thin Liquids  -- Medication Whole with Thin Liquid  -- Sitting Upright  -- Small Bites/Sips    Frequency/Duration: Patient will be followed by speech-language pathology 2 times a week to address goals. Discharge Recommendations: To Be Determined     SUBJECTIVE:   Patient stated my speech has gotten better.     OBJECTIVE:     Past Medical History:   Diagnosis Date    Hypertension      Past Surgical History:   Procedure Laterality Date    HX HERNIA REPAIR      HX PROSTATECTOMY 2015     Prior Level of Function/Home Situation:  Home Situation  Home Environment: Private residence  # Steps to Enter: 5  One/Two Story Residence: One story  Living Alone: No  Support Systems: Spouse/Significant Other  Patient Expects to be Discharged to[de-identified] House  Current DME Used/Available at Home: None    Diet prior to admission: Regular/Thin Liquid  Current Diet: Regular/Thin Liquid    Cognitive and Communication Status:  Neurologic State: Alert  Orientation Level: Oriented X4  Cognition: Appropriate for age attention/concentration,Follows commands  Perception: Appears intact  Perseveration: No perseveration noted  Safety/Judgement: Awareness of environment,Insight into deficits    Oral Assessment:  Oral Assessment  Labial: Left droop  Dentition: Full;Natural  Oral Hygiene: Moist oral mucosa  Lingual: No impairment  Velum: No impairment  Mandible: No impairment    P.O. Trials:  Patient Position: Upright in chair  Vocal quality prior to P.O.: No impairment  Consistency Presented: Thin liquid;Puree; Solid  How Presented: Self-fed/presented;Straw;Successive swallows;Spoon     Bolus Acceptance: No impairment  Bolus Formation/Control: No impairment     Propulsion: No impairment  Oral Residue: None  Initiation of Swallow: No impairment  Laryngeal Elevation: Functional  Aspiration Signs/Symptoms: None  Pharyngeal Phase Characteristics: No impairment, issues, or problems              Oral Phase Severity: No impairment  Pharyngeal Phase Severity : No impairment    NOMS:   The NOMS functional outcome measure was used to quantify this patient's level of swallowing impairment. Based on the NOMS, the patient was determined to be at level 7 for swallow function     NOMS Swallowing Levels:  Level 1 (CN): NPO  Level 2 (CM): NPO but takes consistency in therapy  Level 3 (CL): Takes less than 50% of nutrition p.o. and continues with nonoral feedings; and/or safe with mod cues; and/or max diet restriction  Level 4 (CK):  Safe swallow but needs mod cues; and/or mod diet restriction; and/or still requires some nonoral feeding/supplements  Level 5 (CJ): Safe swallow with min diet restriction; and/or needs min cues  Level 6 (CI): Independent with p.o.; rare cues; usually self cues; may need to avoid some foods or needs extra time  Level 7 ([address] [place] [address]): Independent for all p.o.  TAVARES. (2003). National Outcomes Measurement System (NOMS): Adult Speech-Language Pathology User's Guide. Pain:  Pain Scale 1: Numeric (0 - 10)  Pain Intensity 1: 0       After treatment:   Patient left in no apparent distress sitting up in chair, Call bell within reach, and Nursing notified    COMMUNICATION/EDUCATION:   Patient was educated regarding his deficit(s) of WFL swallow as this relates to his diagnosis of acute R pontine infarct. He demonstrated good understanding as evidenced by verbalizing understanding. The patient's plan of care including recommendations, planned interventions, and recommended diet changes were discussed with: Registered nurse. Patient/family have participated as able in goal setting and plan of care. Patient/family agree to work toward stated goals and plan of care.     Thank you for this referral.  Mark Arana, SLP  Time Calculation: 10 mins

## 2022-01-05 NOTE — PROGRESS NOTES
MrIlya Hurley has been updated to Inpatient. I advised him of this. His girlfriend Margot Brothers was with him. It was noted that inpatient nights contribute to the three night midnight stay for inpatient rehab/SNF.

## 2022-01-05 NOTE — PROGRESS NOTES
Problem: TIA/CVA Stroke: 0-24 hours  Goal: Off Pathway (Use only if patient is Off Pathway)  1/4/2022 2033 by Lev Carrera RN  Outcome: Progressing Towards Goal  1/4/2022 2031 by Lev Carrera RN  Outcome: Progressing Towards Goal  Goal: Diagnostic Test/Procedures  Outcome: Progressing Towards Goal  Goal: Nutrition/Diet  Outcome: Progressing Towards Goal  Goal: Medications  Outcome: Progressing Towards Goal  Goal: *Neurologically stable  Description: Absence of additional neurological deficits    Outcome: Progressing Towards Goal     Problem: TIA/CVA Stroke: Day 2 Until Discharge  Goal: Off Pathway (Use only if patient is Off Pathway)  Outcome: Progressing Towards Goal  Goal: Activity/Safety  Outcome: Progressing Towards Goal     Problem: Ischemic Stroke: Discharge Outcomes  Goal: *Verbalize understanding of risk factor modification(Stroke Metric)  Outcome: Progressing Towards Goal

## 2022-01-05 NOTE — PROGRESS NOTES
Watching TV. S/O in earlier, discussed new meds for pt, Lipitor and Plavix. She asked questions and meds were explained by nursing and by MD. Plan is for dc tomorrow if pt remains stable.

## 2022-01-05 NOTE — PROGRESS NOTES
Care Management Interventions  PCP Verified by CM: Yes (Mr. Libertad Lazcano uses the [facility] ([facility] in [place] for [place] care))  Palliative Care Criteria Met (RRAT>21 & CHF Dx)?: No  Mode of Transport at Discharge: Other (see comment) (POV/Family)  Transition of Care Consult (CM Consult): Discharge Planning  MyChart Signup: No  Discharge Durable Medical Equipment: No  Physical Therapy Consult: Yes  Occupational Therapy Consult: Yes  Speech Therapy Consult: Yes  Support Systems: Spouse/Significant Other,Other Family Member(s)  Confirm Follow Up Transport: Self  [place] Resource Information Provided?: No  Discharge Location  Discharge Placement: Unable to determine at this time     Mr. Libertad Lazcano was admitted under Observation Status 1/4/22 with Stroke. The State Observation notification was received at admission. The Care Management introductory letter with contact information was given at intake. Mr. Libertad Lazcano resides with his niece Jere Norman and his girlfriend Britton Meraz in [place]. He is normally able to manage his care needs. He uses the [facility] in [place] ([address]) for healthcare. [facility] in the [facility]. He saw Dr Aurora Coronado in [facility] 16 2/23/21 but this exam was for employment clearance. He does not have an Advance Directive but his niece is designated as his primary decision maker for healthcare. I gave him the blank Advance Directive Document with contact information. I will be completing the V.A. paperwork and sending it to the V.A. notifying of this admission. For long term care planning, I advised Mr. Libertad Lazcano of the Aid and Attendance Pension through the V.A. for veterans of war who need ADL assistance and qualify financially. Advance Care Planning     General Advance Care Planning (ACP) Conversation       Date of Conversation: 1/5/22  Conducted with: Patient with Decision Making Capacity    Healthcare Decision Maker:   No healthcare decision makers have been documented.    Click here to complete HealthCare Decision Makers including selection of the Healthcare Decision Maker Relationship (ie \"Primary\")    elise Norwood, [phone]    Content/Action Overview:   Has NO ACP documents/care preferences - information provided, considering goals and options  Reviewed DNR/DNI and patient elects Full Code (Attempt Resuscitation)        Length of Voluntary ACP Conversation in minutes:  <16 minutes (Non-Billable)    Hannah Hook     Reason for Admission:  Stoke                     RUR Score:    N/A Observation    RRAT: 3 LOW                 Plan for utilizing home health: TBD - V. A.  Patient         PCP: First and Last name: [facility]     Name of Practice: [address]   Are you a current patient: Yes/No: YES   Approximate date of last visit: Unknown   Can you participate in a virtual visit with your PCP: YES                    Current Advanced Directive/Advance Care Plan: Full Code      Healthcare Decision Maker:   Click here to complete [address] including selection of the Healthcare Decision Maker Relationship (ie \"Primary\")    elise Norwood, [phone]                             Transition of Care Plan:  TBD

## 2022-01-05 NOTE — PROGRESS NOTES
[facility]  Hospitalist Progress Note    NAME: Hussein Kelley   :  1955   MRN:  [id number]     Total duration of encounter: 1 day      Interim Hospital Summary: 77 y.o. male who presented on 2022 with Stroke [facility]). He has a past medical history of Hypertension. . Admitted for evaluation of balance c/o,  tendency to lean / walk to the L side. Onset noted Monday AM.  Was restless on Sun night. Pt presented to ED on Tue, since symptoms persisted and the weather had cleared. Exam noted for mild L LE weakness compared to R.  UE are symmetric. Has been given Plavix daily        Subjective:     Chief Complaint / Reason for Physician Visit  \"stable\".   Discussed with RN   Ready to get back to bed  Tired following rehab    Review of Systems:  Symptom Y/N Comments  Symptom Y/N Comments   Fever/Chills n   Chest Pain n    Poor Appetite n   Edema n    Cough n   Abdominal Pain n    Sputum n   Joint Pain n    SOB/BAILEY n   Pruritis/Rash n    Nausea/vomit n   Tolerating PT/OT n    Diarrhea n   Tolerating Diet     Constipation n   Other         Current Facility-Administered Medications:     atorvastatin (LIPITOR) tablet 40 mg, 40 mg, Oral, PCD, Naga Singh MD    verapamiL (CALAN) tablet 80 mg, 80 mg, Oral, Q8H, Naga Singh MD, 80 mg at 22 0607    sodium chloride (NS) flush 5-40 mL, 5-40 mL, IntraVENous, Q8H, Naga Singh MD, 10 mL at 22 0607    sodium chloride (NS) flush 5-40 mL, 5-40 mL, IntraVENous, PRN, Naga Singh MD    polyethylene glycol (MIRALAX) packet 17 g, 17 g, Oral, DAILY PRN, Naga Singh MD    ondansetron (ZOFRAN ODT) tablet 4 mg, 4 mg, Oral, Q8H PRN **OR** ondansetron (ZOFRAN) injection 4 mg, 4 mg, IntraVENous, Q6H PRN, Naga Singh MD    enoxaparin (LOVENOX) injection 40 mg, 40 mg, SubCUTAneous, DAILY, Naga Singh MD, 40 mg at 22 0929    famotidine (PEPCID) tablet 20 mg, 20 mg, Oral, QPM, Naga Singh MD, 20 mg at 22 1715    Objective: VITALS:   Patient Vitals for the past 12 hrs:   Temp Pulse Resp BP SpO2   01/05/22 0800  70      01/05/22 0713 98.8 °F (37.1 °C) 76 20 128/88 96 %   01/05/22 0600 98.3 °F (36.8 °C) 72 18 (!) 150/95 98 %   01/05/22 0400 98.6 °F (37 °C) 75 20 (!) 144/94 98 %   01/05/22 0200 97.5 °F (36.4 °C) 76 18 (!) 161/104 98 %   01/05/22 0017 98 °F (36.7 °C) 66 20 (!) 156/95 99 %       Intake/Output Summary (Last 24 hours) at 1/5/2022 1051  Last data filed at 1/5/2022 0929  Gross per 24 hour   Intake 240 ml   Output 300 ml   Net -60 ml        PHYSICAL EXAM:  General: WD, WN. Alert, cooperative, no acute distress. Was leaning to R in chair  EENT:  EOMI. Anicteric sclerae. MMM  Resp:  CTA bilaterally, no wheezing or rales. No accessory muscle use  CV:  Regular  rhythm,  No edema  GI:  Soft, Non distended, Non tender. +Bowel sounds  Neurologic:  Alert and oriented X 3, normal speech, nonfocal   Psych:   Good insight. Not anxious nor agitated  Skin:  No rashes. No jaundice    LABS:  I reviewed today's most current labs and imaging studies. Pertinent labs include:  Recent Labs     01/05/22  0520 01/04/22  1205   WBC 5.1 6.2   HGB 12.1 13.0   HCT 36.8 40.1    331     Recent Labs     01/05/22  0520 01/04/22  1205    142   K 3.8 4.0    106   CO2 25 25   GLU 93 117*   BUN 22* 23*   CREA 1.14 1.16   CA 8.9 9.4   MG 2.2  --    ALB  --  3.8   TBILI  --  0.5   ALT  --  24   INR  --  1.1       RADIOLOGY:  CT HEAD 1/4:  No calvarial abnormalities are detected. Abnormal soft tissue density is noted  within the maxillary sinuses. Very mild deviation of the midportion of the nasal  septum towards the left is present. There is no evidence of intracranial hemorrhage.   IMPRESSION  1. No evidence of intracranial hemorrhage  2. Presence of bilateral maxillary sinus disease. CTA HEAD / NECK 1/4:  CTA Head: No evidence of flow-limiting stenosis or aneurysm.  Scattered atherosclerotic  disease as above.   CTA Neck: No evidence of significant stenosis. PCXR 1/4:    ECHO 1/5:    Left Ventricle: Left ventricle size is normal. Mildly increased wall thickness. Normal wall motion. Normal left ventricular systolic function with a visually estimated EF of 65 - 70%. Normal diastolic function. MRI 1/5:  Acute infarct in the right paramedian radha.   The ventricles are normal in size and position. Few scattered periventricular  and deep white matter T2/FLAIR hyperintensities, most prominent in the  periatrial regions, consistent with minimal chronic microvascular ischemic  disease. There is no acute hemorrhage, extra-axial fluid collection, or mass  effect. There is no cerebellar tonsillar herniation. Expected arterial  flow-voids are present.   Scattered mucosal thickening in the bilateral maxillary sinuses without  air-fluid level. Small left mastoid effusion. The orbital contents are within  normal limits. No significant osseous or scalp lesions are identified.   IMPRESSION: Acute infarct in the right paramedian radha. EKG: NSR 73 bpm, NSTWC, No prev  Procedures: see electronic medical records for all procedures/Xrays and details which were not copied into this note but were reviewed prior to creation of Plan. Assessment / Plan:  Principal Problem:    Stroke [facility]) (1/4/2022)  Apparent R cerebral event with L sided findings  MRI / ECHO ordered  CT and CTA noted and reviewed by Tele-Neurology  To begin Plavix 75mg daily  Check Lipids, plan Lipitor high dose  PT/OT/ST consults  Discharge planning     Active Problems:    History of prostate cancer (3/12/2018)  Tx 2015.   Radical prostatectomy with residual incontinence / wears depends.       Essential hypertension (3/12/2018)  Reduce Verapamil 60mg tid  Follow, longterm control with Sys below 140     SAFETY:   Code Status:Full  DVT prophylaxis:Lovenox  Stress Ulcer prophylaxis: Pepcid  Bladder catheter:no  Family Contact Info:  Primary Emergency Contact: Davey Dow Phone: [phone]  Bedded: [facility] Room 118/01  Disposition: TBD, likely home when stable  Admission status:  Observation upgraded to Inpt per Utilization Review      Reviewed most current lab test results and cultures  YES  Reviewed most current radiology test results   YES  Review and summation of old records today    NO  Reviewed patient's current orders and MAR    YES  PMH/SH reviewed - no change compared to H&P    Care Plan discussed with:                                   Comments  Patient x     Family      RN x     Care Manager  x     Consultant                           x Multidiciplinary team rounds were held today with , nursing, pharmacist and clinical coordinator. Patient's plan of care was discussed; medications were reviewed and discharge planning was addressed.         ____________________________________________    Total NON Critical Care TIME:  35   Minutes        Comments   >50% of visit spent in counseling and coordination of care   x      Signed: George Astorga MD  [facility] Hospitalist  [phone]

## 2022-01-05 NOTE — PROGRESS NOTES
Problem: Falls - Risk of  Goal: *Absence of Falls  Description: Document Clearence Skates Fall Risk and appropriate interventions in the flowsheet.   Outcome: Progressing Towards Goal  Note: Fall Risk Interventions:            Medication Interventions: Assess postural VS orthostatic hypotension                   Problem: Patient Education: Go to Patient Education Activity  Goal: Patient/Family Education  Outcome: Progressing Towards Goal     Problem: Hypertension  Goal: *Blood pressure within specified parameters  Outcome: Progressing Towards Goal  Goal: *Fluid volume balance  Outcome: Progressing Towards Goal  Goal: *Labs within defined limits  Outcome: Progressing Towards Goal     Problem: Patient Education: Go to Patient Education Activity  Goal: Patient/Family Education  Outcome: Progressing Towards Goal     Problem: Patient Education: Go to Patient Education Activity  Goal: Patient/Family Education  Outcome: Progressing Towards Goal     Problem: TIA/CVA Stroke: 0-24 hours  Goal: Off Pathway (Use only if patient is Off Pathway)  Outcome: Progressing Towards Goal  Goal: Activity/Safety  Outcome: Progressing Towards Goal  Goal: Consults, if ordered  Outcome: Progressing Towards Goal  Goal: Diagnostic Test/Procedures  Outcome: Progressing Towards Goal  Goal: Nutrition/Diet  Outcome: Progressing Towards Goal  Goal: Discharge Planning  Outcome: Progressing Towards Goal  Goal: Medications  Outcome: Progressing Towards Goal  Goal: Respiratory  Outcome: Progressing Towards Goal  Goal: Treatments/Interventions/Procedures  Outcome: Progressing Towards Goal  Goal: Minimize risk of bleeding post-thrombolytic infusion  Outcome: Progressing Towards Goal  Goal: Monitor for complications post-thrombolytic infusion  Outcome: Progressing Towards Goal  Goal: Psychosocial  Outcome: Progressing Towards Goal  Goal: *Hemodynamically stable  Outcome: Progressing Towards Goal  Goal: *Neurologically stable  Description: Absence of additional neurological deficits    Outcome: Progressing Towards Goal  Goal: *Verbalizes anxiety and depression are reduced or absent  Outcome: Progressing Towards Goal  Goal: *Absence of Signs of Aspiration on Current Diet  Outcome: Progressing Towards Goal  Goal: *Absence of deep venous thrombosis signs and symptoms(Stroke Metric)  Outcome: Progressing Towards Goal  Goal: *Ability to perform ADLs and demonstrates progressive mobility and function  Outcome: Progressing Towards Goal  Goal: *Stroke education started(Stroke Metric)  Outcome: Progressing Towards Goal  Goal: *Dysphagia screen performed(Stroke Metric)  Outcome: Progressing Towards Goal  Goal: *Rehab consulted(Stroke Metric)  Outcome: Progressing Towards Goal     Problem: TIA/CVA Stroke: Day 2 Until Discharge  Goal: Off Pathway (Use only if patient is Off Pathway)  Outcome: Progressing Towards Goal  Goal: Activity/Safety  Outcome: Progressing Towards Goal  Goal: Diagnostic Test/Procedures  Outcome: Progressing Towards Goal  Goal: Nutrition/Diet  Outcome: Progressing Towards Goal  Goal: Discharge Planning  Outcome: Progressing Towards Goal  Goal: Medications  Outcome: Progressing Towards Goal  Goal: Respiratory  Outcome: Progressing Towards Goal  Goal: Treatments/Interventions/Procedures  Outcome: Progressing Towards Goal  Goal: Psychosocial  Outcome: Progressing Towards Goal  Goal: *Verbalizes anxiety and depression are reduced or absent  Outcome: Progressing Towards Goal  Goal: *Absence of aspiration  Outcome: Progressing Towards Goal  Goal: *Absence of deep venous thrombosis signs and symptoms(Stroke Metric)  Outcome: Progressing Towards Goal  Goal: *Optimal pain control at patient's stated goal  Outcome: Progressing Towards Goal  Goal: *Tolerating diet  Outcome: Progressing Towards Goal  Goal: *Ability to perform ADLs and demonstrates progressive mobility and function  Outcome: Progressing Towards Goal  Goal: *Stroke education continued(Stroke Metric)  Outcome: Progressing Towards Goal     Problem: Ischemic Stroke: Discharge Outcomes  Goal: *Verbalizes anxiety and depression are reduced or absent  Outcome: Progressing Towards Goal  Goal: *Verbalize understanding of risk factor modification(Stroke Metric)  Outcome: Progressing Towards Goal  Goal: *Hemodynamically stable  Outcome: Progressing Towards Goal  Goal: *Absence of aspiration pneumonia  Outcome: Progressing Towards Goal  Goal: *Aware of needed dietary changes  Outcome: Progressing Towards Goal  Goal: *Verbalize understanding of prescribed medications including anti-coagulants, anti-lipid, and/or anti-platelets(Stroke Metric)  Outcome: Progressing Towards Goal  Goal: *Tolerating diet  Outcome: Progressing Towards Goal  Goal: *Aware of follow-up diagnostics related to anticoagulants  Outcome: Progressing Towards Goal  Goal: *Ability to perform ADLs and demonstrates progressive mobility and function  Outcome: Progressing Towards Goal  Goal: *Absence of DVT(Stroke Metric)  Outcome: Progressing Towards Goal  Goal: *Absence of aspiration  Outcome: Progressing Towards Goal  Goal: *Optimal pain control at patient's stated goal  Outcome: Progressing Towards Goal  Goal: *Home safety concerns addressed  Outcome: Progressing Towards Goal  Goal: *Describes available resources and support systems  Outcome: Progressing Towards Goal  Goal: *Verbalizes understanding of activation of EMS(911) for stroke symptoms(Stroke Metric)  Outcome: Progressing Towards Goal  Goal: *Understands and describes signs and symptoms to report to providers(Stroke Metric)  Outcome: Progressing Towards Goal  Goal: *Neurolgocially stable (absence of additional neurological deficits)  Outcome: Progressing Towards Goal  Goal: *Verbalizes importance of follow-up with primary care physician(Stroke Metric)  Outcome: Progressing Towards Goal  Goal: *Smoking cessation discussed,if applicable(Stroke Metric)  Outcome: Progressing Towards Goal  Goal: *Depression screening completed(Stroke Metric)  Outcome: Progressing Towards Goal

## 2022-01-06 PROCEDURE — 74011000250 HC RX REV CODE- 250: Performed by: INTERNAL MEDICINE

## 2022-01-06 PROCEDURE — 65270000029 HC RM PRIVATE

## 2022-01-06 PROCEDURE — 97530 THERAPEUTIC ACTIVITIES: CPT

## 2022-01-06 PROCEDURE — 74011250636 HC RX REV CODE- 250/636: Performed by: INTERNAL MEDICINE

## 2022-01-06 PROCEDURE — 74011250637 HC RX REV CODE- 250/637: Performed by: INTERNAL MEDICINE

## 2022-01-06 PROCEDURE — 94760 N-INVAS EAR/PLS OXIMETRY 1: CPT

## 2022-01-06 RX ORDER — METOPROLOL SUCCINATE 25 MG/1
25 TABLET, EXTENDED RELEASE ORAL DAILY
Status: DISCONTINUED | OUTPATIENT
Start: 2022-01-07 | End: 2022-01-07 | Stop reason: HOSPADM

## 2022-01-06 RX ORDER — AMLODIPINE BESYLATE 5 MG/1
5 TABLET ORAL
Status: DISCONTINUED | OUTPATIENT
Start: 2022-01-06 | End: 2022-01-07 | Stop reason: HOSPADM

## 2022-01-06 RX ADMIN — CLOPIDOGREL BISULFATE 75 MG: 75 TABLET ORAL at 10:05

## 2022-01-06 RX ADMIN — VERAPAMIL HYDROCHLORIDE 80 MG: 80 TABLET, FILM COATED ORAL at 13:06

## 2022-01-06 RX ADMIN — SODIUM CHLORIDE, PRESERVATIVE FREE 10 ML: 5 INJECTION INTRAVENOUS at 22:38

## 2022-01-06 RX ADMIN — FAMOTIDINE 20 MG: 20 TABLET ORAL at 18:45

## 2022-01-06 RX ADMIN — ENOXAPARIN SODIUM 40 MG: 100 INJECTION SUBCUTANEOUS at 10:05

## 2022-01-06 RX ADMIN — ATORVASTATIN CALCIUM 40 MG: 40 TABLET, FILM COATED ORAL at 18:45

## 2022-01-06 RX ADMIN — SODIUM CHLORIDE, PRESERVATIVE FREE 10 ML: 5 INJECTION INTRAVENOUS at 06:58

## 2022-01-06 RX ADMIN — VERAPAMIL HYDROCHLORIDE 80 MG: 80 TABLET, FILM COATED ORAL at 06:57

## 2022-01-06 RX ADMIN — AMLODIPINE BESYLATE 5 MG: 5 TABLET ORAL at 22:37

## 2022-01-06 RX ADMIN — SODIUM CHLORIDE, PRESERVATIVE FREE 10 ML: 5 INJECTION INTRAVENOUS at 13:05

## 2022-01-06 NOTE — PROGRESS NOTES
Bedside shift change report given to KINA Sawyer RN (oncoming nurse) by Daniele Boyd RN   (offgoing nurse). Report included the following information Kardex and Recent Results.

## 2022-01-06 NOTE — PROGRESS NOTES
Resting quietly in bed without complaints. S/O in earlier to help with ADLs. Pt remains stable and cooperative.

## 2022-01-06 NOTE — PROGRESS NOTES
Problem: Falls - Risk of  Goal: *Absence of Falls  Description: Document Kunal Collins Fall Risk and appropriate interventions in the flowsheet.   Outcome: Progressing Towards Goal  Note: Fall Risk Interventions:  Mobility Interventions: Bed/chair exit alarm         Medication Interventions: Patient to call before getting OOB         History of Falls Interventions: Bed/chair exit alarm,Door open when patient unattended         Problem: Hypertension  Goal: *Blood pressure within specified parameters  Outcome: Progressing Towards Goal  Goal: *Labs within defined limits  Outcome: Progressing Towards Goal     Problem: TIA/CVA Stroke: 0-24 hours  Goal: Medications  Outcome: Progressing Towards Goal

## 2022-01-06 NOTE — PROGRESS NOTES
IDR Team; MD, Nursing, Care Manager, Physical therapy, Nursing Supervisor, Pharmacy and Dietician, met to review patient's plan of care. Discussed goals, interventions, barriers and progress. RUR: 6%    Team will continue to monitor progress and report any concerns to the physician and care management as indicated. Transition of Care Plan:   According to therapy, patient become hypertensive when standing (178/105) so a full assessment could not be performed. Will reassess later today to see how he does.

## 2022-01-06 NOTE — PROGRESS NOTES
Bedside shift change report given to Yolanda rondon (oncoming nurse) by Sadia Alves (offgoing nurse). Report included the following information SBAR, Kardex and MAR.

## 2022-01-06 NOTE — PROGRESS NOTES
Problem: Self Care Deficits Care Plan (Adult)  Goal: *Acute Goals and Plan of Care (Insert Text)  Description: FUNCTIONAL STATUS PRIOR TO ADMISSION: Patient was independent and active without use of DME.     HOME SUPPORT: The patient lived with girlfriend and family but did not require assist.    Occupational Therapy Goals  Initiated 1/5/2022  1. Patient will perform bathing with supervision/set-up within 7 day(s). 2.  Patient will perform lower body dressing with supervision/set-up within 7 day(s). 3.  Patient will perform shower transfer with modified independence within 7 day(s). 4.  Patient will perform toilet transfers with modified independence within 7 day(s). 5.  Patient will perform all aspects of toileting with modified independence within 7 day(s). 6.  Patient will participate in upper extremity therapeutic exercise/activities with supervision/set-up for 8 minutes within 7 day(s). 7.  Patient will utilize energy conservation techniques during functional activities with verbal cues within 7 day(s). Outcome: Not Progressing Towards Goal   OCCUPATIONAL THERAPY TREATMENT  Patient: Mariluz Pozo (84 y.o. male)  Date: 1/6/2022  Diagnosis: Stroke [facility]) [I63.9]  Cerebrovascular accident (CVA) involving right cerebral hemisphere [facility]) [I63.9] Stroke [facility])       Precautions:  fall risk  Chart, occupational therapy assessment, plan of care, and goals were reviewed. ASSESSMENT  Patient continues with skilled OT services and is not progressing towards goals. OTR and PT attempted to complete evlauation for pt this morning. His BP continues to elevate with position changes. He is independent in bed mobility and sit to stand transfers but his BP jumps from 157/94 to 161/102 in standing and with light balance testing by PT after sitting his BP was 178/102. . Pt's HR never elevated above 88 bpm. Once supine his BP dropped to 146/86.     Current Level of Function Impacting Discharge (ADLs): Pt seems to be independent in self care but he is fall risk for standing and his BP is not controlled. We still aren't able to ambulate but his balance in standing is impaired. Other factors to consider for discharge: BP not controlled         PLAN :  Patient continues to benefit from skilled intervention to address the above impairments. Continue treatment per established plan of care to address goals. Recommend with staff: BSC use if BP still elevated in standing    Recommend next OT session: continue monitoring     Recommendation for discharge: (in order for the patient to meet his/her long term goals)  Occupational therapy at least 2 days/week in the home AND ensure assist and/or supervision for safety with ambulation    This discharge recommendation:  Has been made in collaboration with the attending provider and/or case management    IF patient discharges home will need the following DME: bedside commode and walker: rolling       SUBJECTIVE:   Patient stated feels ok today.  re: headache    OBJECTIVE DATA SUMMARY:   Cognitive/Behavioral Status:  Neurologic State: Alert  Orientation Level: Oriented X4  Cognition: Appropriate decision making; Follows commands    Functional Mobility and Transfers for ADLs:  Bed Mobility:  Rolling: Independent  Supine to Sit: Independent  Sit to Supine: Independent  Scooting: Independent    Transfers:  Sit to Stand: Supervision    Balance:   Impaired in standing with eyes closed and when taking one step out or back    ADL Intervention:    Lower Body Dressing Assistance  Socks:  Independent      Pain: no complaints today    Activity Tolerance:   Elevated BP with position change, difficult to recover    After treatment patient left in no apparent distress:   Supine in bed, Call bell within reach, Bed / chair alarm activated, and Side rails x 3    COMMUNICATION/COLLABORATION:   The patients plan of care was discussed with: Physical therapist.     Abdi Branch OT  Time Calculation: 13 mins

## 2022-01-07 ENCOUNTER — APPOINTMENT (OUTPATIENT)
Dept: GENERAL RADIOLOGY | Age: 67
DRG: 066 | End: 2022-01-07
Attending: INTERNAL MEDICINE
Payer: OTHER GOVERNMENT

## 2022-01-07 LAB
ANION GAP SERPL CALC-SCNC: 7 MMOL/L (ref 5–15)
BUN SERPL-MCNC: 23 MG/DL (ref 6–20)
BUN/CREAT SERPL: 21 (ref 12–20)
CALCIUM SERPL-MCNC: 9.1 MG/DL (ref 8.5–10.1)
CHLORIDE SERPL-SCNC: 103 MMOL/L (ref 97–108)
CO2 SERPL-SCNC: 27 MMOL/L (ref 21–32)
COMMENT, HOLDF: NORMAL
CREAT SERPL-MCNC: 1.1 MG/DL (ref 0.7–1.3)
GLUCOSE SERPL-MCNC: 88 MG/DL (ref 65–100)
HGB BLD-MCNC: 12.8 G/DL (ref 12.1–17)
POTASSIUM SERPL-SCNC: 3.9 MMOL/L (ref 3.5–5.1)
SAMPLES BEING HELD,HOLD: NORMAL
SODIUM SERPL-SCNC: 137 MMOL/L (ref 136–145)

## 2022-01-07 PROCEDURE — 74011250637 HC RX REV CODE- 250/637: Performed by: INTERNAL MEDICINE

## 2022-01-07 PROCEDURE — 85018 HEMOGLOBIN: CPT

## 2022-01-07 PROCEDURE — 97110 THERAPEUTIC EXERCISES: CPT

## 2022-01-07 PROCEDURE — 36415 COLL VENOUS BLD VENIPUNCTURE: CPT

## 2022-01-07 PROCEDURE — 74011250636 HC RX REV CODE- 250/636: Performed by: INTERNAL MEDICINE

## 2022-01-07 PROCEDURE — 71045 X-RAY EXAM CHEST 1 VIEW: CPT

## 2022-01-07 PROCEDURE — 80048 BASIC METABOLIC PNL TOTAL CA: CPT

## 2022-01-07 PROCEDURE — 97116 GAIT TRAINING THERAPY: CPT

## 2022-01-07 PROCEDURE — 94760 N-INVAS EAR/PLS OXIMETRY 1: CPT

## 2022-01-07 PROCEDURE — 92526 ORAL FUNCTION THERAPY: CPT

## 2022-01-07 PROCEDURE — 97535 SELF CARE MNGMENT TRAINING: CPT

## 2022-01-07 RX ORDER — CLOPIDOGREL BISULFATE 75 MG/1
75 TABLET ORAL DAILY
Qty: 30 TABLET | Refills: 0 | Status: SHIPPED | OUTPATIENT
Start: 2022-01-07 | End: 2022-01-07 | Stop reason: SDUPTHER

## 2022-01-07 RX ORDER — AMLODIPINE BESYLATE 5 MG/1
5 TABLET ORAL
Qty: 30 TABLET | Refills: 0 | Status: SHIPPED | OUTPATIENT
Start: 2022-01-07 | End: 2022-02-06

## 2022-01-07 RX ORDER — ATORVASTATIN CALCIUM 40 MG/1
40 TABLET, FILM COATED ORAL
Qty: 30 TABLET | Refills: 0 | Status: SHIPPED | OUTPATIENT
Start: 2022-01-07 | End: 2022-01-07 | Stop reason: SDUPTHER

## 2022-01-07 RX ORDER — METOPROLOL SUCCINATE 25 MG/1
25 TABLET, EXTENDED RELEASE ORAL DAILY
Qty: 30 TABLET | Refills: 0 | Status: SHIPPED | OUTPATIENT
Start: 2022-01-08 | End: 2022-02-07

## 2022-01-07 RX ORDER — METOPROLOL SUCCINATE 25 MG/1
25 TABLET, EXTENDED RELEASE ORAL DAILY
Qty: 30 TABLET | Refills: 0 | Status: SHIPPED | OUTPATIENT
Start: 2022-01-08 | End: 2022-01-07 | Stop reason: SDUPTHER

## 2022-01-07 RX ORDER — ATORVASTATIN CALCIUM 40 MG/1
40 TABLET, FILM COATED ORAL
Qty: 30 TABLET | Refills: 0 | Status: SHIPPED | OUTPATIENT
Start: 2022-01-07 | End: 2022-02-06

## 2022-01-07 RX ORDER — AMLODIPINE BESYLATE 5 MG/1
5 TABLET ORAL
Qty: 30 TABLET | Refills: 0 | Status: SHIPPED | OUTPATIENT
Start: 2022-01-07 | End: 2022-01-07

## 2022-01-07 RX ORDER — CLOPIDOGREL BISULFATE 75 MG/1
75 TABLET ORAL DAILY
Qty: 30 TABLET | Refills: 0 | Status: SHIPPED | OUTPATIENT
Start: 2022-01-07 | End: 2022-02-06

## 2022-01-07 RX ADMIN — METOPROLOL SUCCINATE 25 MG: 25 TABLET, EXTENDED RELEASE ORAL at 11:25

## 2022-01-07 RX ADMIN — FAMOTIDINE 20 MG: 20 TABLET ORAL at 11:35

## 2022-01-07 RX ADMIN — CLOPIDOGREL BISULFATE 75 MG: 75 TABLET ORAL at 11:26

## 2022-01-07 RX ADMIN — ENOXAPARIN SODIUM 40 MG: 100 INJECTION SUBCUTANEOUS at 11:25

## 2022-01-07 NOTE — PROGRESS NOTES
Problem: Falls - Risk of  Goal: *Absence of Falls  Description: Document Shavonne Marking Fall Risk and appropriate interventions in the flowsheet.   Outcome: Progressing Towards Goal  Note: Fall Risk Interventions:  Mobility Interventions: Bed/chair exit alarm         Medication Interventions: Bed/chair exit alarm         History of Falls Interventions: Bed/chair exit alarm         Problem: Patient Education: Go to Patient Education Activity  Goal: Patient/Family Education  Outcome: Progressing Towards Goal     Problem: Hypertension  Goal: *Blood pressure within specified parameters  Outcome: Progressing Towards Goal  Goal: *Labs within defined limits  Outcome: Progressing Towards Goal     Problem: Patient Education: Go to Patient Education Activity  Goal: Patient/Family Education  Outcome: Progressing Towards Goal     Problem: TIA/CVA Stroke: Day 2 Until Discharge  Goal: Off Pathway (Use only if patient is Off Pathway)  Outcome: Progressing Towards Goal  Goal: Activity/Safety  Outcome: Progressing Towards Goal  Goal: Diagnostic Test/Procedures  Outcome: Progressing Towards Goal  Goal: Nutrition/Diet  Outcome: Progressing Towards Goal  Goal: Discharge Planning  Outcome: Progressing Towards Goal  Goal: Medications  Outcome: Progressing Towards Goal  Goal: Respiratory  Outcome: Progressing Towards Goal  Goal: Psychosocial  Outcome: Progressing Towards Goal  Goal: *Verbalizes anxiety and depression are reduced or absent  Outcome: Progressing Towards Goal  Goal: *Absence of aspiration  Outcome: Progressing Towards Goal

## 2022-01-07 NOTE — PROGRESS NOTES
Problem: Self Care Deficits Care Plan (Adult)  Goal: *Acute Goals and Plan of Care (Insert Text)  Description: FUNCTIONAL STATUS PRIOR TO ADMISSION: Patient was independent and active without use of DME.     HOME SUPPORT: The patient lived with girlfriend and family but did not require assist.    Occupational Therapy Goals  Initiated 1/5/2022  1. Patient will perform bathing with supervision/set-up within 7 day(s). MET  2. Patient will perform lower body dressing with supervision/set-up within 7 day(s). MET  3. Patient will perform shower transfer with modified independence within 7 day(s). 4.  Patient will perform toilet transfers with modified independence within 7 day(s). 5.  Patient will perform all aspects of toileting with modified independence within 7 day(s). 6.  Patient will participate in upper extremity therapeutic exercise/activities with supervision/set-up for 8 minutes within 7 day(s). 7.  Patient will utilize energy conservation techniques during functional activities with verbal cues within 7 day(s). MET    Outcome: Progressing Towards Goal   OCCUPATIONAL THERAPY TREATMENT  Patient: Jeevan Mckenna (48 y.o. male)  Date: 1/7/2022  Diagnosis: Stroke [facility]) [I63.9]  Cerebrovascular accident (CVA) involving right cerebral hemisphere [facility]) [I63.9] Stroke [facility])       Precautions:    Chart, occupational therapy assessment, plan of care, and goals were reviewed. ASSESSMENT  Patient continues with skilled OT services and is progressing towards goals. Today pt BP stable in sitting and standing. Able to perform ADL including ambulating to bathroom to groom at sink. He is supervision for static standing and CGA to SBA for ambulating. Current Level of Function Impacting Discharge (ADLs): Recommended sitting to remove and put on clothing except for pulling up pants. Wash up sitting in chair went well, independent. Independent changing undergarment, getting socks on.  Grooms standing at sink but ambulation is still off without a mobility aid, which PT will make recommendation. IF not for balance issues pt is otherwise independent in self care. Other factors to consider for discharge: will have people at home to assist as needed         PLAN :  Patient continues to benefit from skilled intervention to address the above impairments. Continue treatment per established plan of care to address goals. Recommend with staff: up to toilet with supervision for ambulation    Recommend next OT session: anticipate d/c prior to OTR return on 1/10    Recommendation for discharge: (in order for the patient to meet his/her long term goals)  Occupational therapy at least 2 days/week in the home     This discharge recommendation:  Has been made in collaboration with the attending provider and/or case management    IF patient discharges home will need the following DME: pt will make mobility aid recommendation       SUBJECTIVE:   Patient stated Faina King started me on a new pill.     OBJECTIVE DATA SUMMARY:   Cognitive/Behavioral Status:   alert, cooperative                   Functional Mobility and Transfers for ADLs:  Bed Mobility:  Rolling: Independent  Supine to Sit: Independent  Sit to Supine: Independent  Scooting: Independent    Transfers:  Sit to Stand: Supervision  Functional Transfers  Bathroom Mobility: Stand-by assistance  Bed to Chair: Independent;Supervision    Balance:  Sitting: Intact  Standing - Static: Good; Unsupported  Standing - Dynamic : Fair;Unsupported    ADL Intervention:       Grooming  Grooming Assistance: Supervision  Position Performed: Standing  Brushing Teeth: Supervision    Upper Body Bathing  Bathing Assistance: Set-up  Position Performed: Seated in chair    Lower Body Bathing  Bathing Assistance: Supervision  Perineal  : Supervision  Position Performed: Standing  Lower Body : Set-up  Position Performed: Seated in chair    Upper [address]: Minimum assistance    Lower Body Dressing Assistance  Socks: Independent         Pain:  No complaints    Activity Tolerance:   Good    After treatment patient left in no apparent distress:   Sitting in chair, Call bell within reach and Bed / chair alarm activated    COMMUNICATION/COLLABORATION:   The patients plan of care was discussed with: Physical therapist and Case management.      Brina Meyer OT  Time Calculation: 27 mins

## 2022-01-07 NOTE — DISCHARGE INSTRUCTIONS
DISCHARGE SUMMARY from Nurse    PATIENT INSTRUCTIONS:    After general anesthesia or intravenous sedation, for 24 hours or while taking prescription Narcotics:  · Limit your activities  · Do not drive and operate hazardous machinery  · Do not make important personal or business decisions  · Do  not drink alcoholic beverages  · If you have not urinated within 8 hours after discharge, please contact your surgeon on call. Report the following to your surgeon:  · Excessive pain, swelling, redness or odor of or around the surgical area  · Temperature over 100.5  · Nausea and vomiting lasting longer than 4 hours or if unable to take medications  · Any signs of decreased circulation or nerve impairment to extremity: change in color, persistent  numbness, tingling, coldness or increase pain  · Any questions    What to do at Home:  Recommended activity: Activity as tolerated,     If you experience any of the following symptoms return or worsening symptoms, please follow up with PCP/ED. *  Please give a list of your current medications to your Primary Care Provider. *  Please update this list whenever your medications are discontinued, doses are      changed, or new medications (including over-the-counter products) are added. *  Please carry medication information at all times in case of emergency situations. These are general instructions for a healthy lifestyle:    No smoking/ No tobacco products/ Avoid exposure to second hand smoke  Surgeon General's Warning:  Quitting smoking now greatly reduces serious risk to your health.     Obesity, smoking, and sedentary lifestyle greatly increases your risk for illness    A healthy diet, regular physical exercise & weight monitoring are important for maintaining a healthy lifestyle    You may be retaining fluid if you have a history of heart failure or if you experience any of the following symptoms:  Weight gain of 3 pounds or more overnight or 5 pounds in a week, increased swelling in our hands or feet or shortness of breath while lying flat in bed. Please call your doctor as soon as you notice any of these symptoms; do not wait until your next office visit. The discharge information has been reviewed with the patient. The patient verbalized understanding. Discharge medications reviewed with the patient and appropriate educational materials and side effects teaching were provided.   ___________________________________________________________________________________________________________________________________

## 2022-01-07 NOTE — PROGRESS NOTES
Problem: Dysphagia (Adult)  Goal: *Acute Goals and Plan of Care (Insert Text)  Description: Speech Pathology Goals  Initiated 1/5/2022    1. Patient will tolerate Regular/Thin Liquid without signs of aspiration within 7 days. MET 1/7/2022  Outcome: Resolved/Met     SPEECH LANGUAGE PATHOLOGY DYSPHAGIA TREATMENT/DISCHARGE  Patient: Lonnie Ty (79 y.o. male)  Date: 1/7/2022  Diagnosis: Stroke [facility]) [I63.9]  Cerebrovascular accident (CVA) involving right cerebral hemisphere [facility]) [I63.9] Stroke [facility])       Precautions:     ASSESSMENT:  Patient is being followed by SLP and initial evaluation on 1/5 revealed a functional oropharyngeal swallow and WFL motor speech, and Regular/Thin Liquids was recommended. Given location of patient's acute infarct, patient followed up with to ensure diet tolerance. Patient continues to present with L labial asymmetry, yet, with WFL oral/pharyngeal phase; No signs of aspiration observed. Given patient's acute R pontine infarct, patient is at risk for aspiration. Due to patient's functional oral/pharyngeal swallow, continue to recommend Regular/Thin Liquid with general aspiration precautions outlined below. No further acute SLP needs. PLAN:  -- Regular/Thin Liquids  -- Medication Whole with Thin Liquid  -- Sitting Upright  -- Small Bites/Sips    Patient will be discharged from acute skilled speech therapy at this time.     Rationale for discharge:  Goals achieved    Discharge Recommendations:  None     SUBJECTIVE:   Patient stated is that where my stroke is?    OBJECTIVE:   Cognitive and Communication Status:  Neurologic State: Alert  Orientation Level: Oriented X4  Cognition: Appropriate for age attention/concentration,Follows commands    Perception: Appears intact    Perseveration: No perseveration noted    Safety/Judgement: Awareness of environment    Dysphagia Treatment:  Oral Assessment:  Oral Assessment  Labial: Left droop  Dentition: Full;Natural  Oral Hygiene: Moist oral mucosa  Lingual: No impairment  Velum: No impairment  Mandible: No impairment    P.O. Trials:  Patient Position: Upright in chair  Vocal quality prior to P.O.: No impairment  Consistency Presented: Thin liquid; Solid  How Presented: Self-fed/presented;Cup/sip;Cup/gulp     Bolus Acceptance: No impairment  Bolus Formation/Control: No impairment     Propulsion: No impairment  Oral Residue: None  Initiation of Swallow: No impairment  Laryngeal Elevation: Functional  Aspiration Signs/Symptoms: None  Pharyngeal Phase Characteristics: No impairment, issues, or problems            Oral Phase Severity: No impairment  Pharyngeal Phase Severity : No impairment    NOMS:   The NOMS functional outcome measure was used to quantify this patient's level of swallowing impairment. Based on the NOMS, the patient was determined to be at level 7 for swallow function     NOMS Swallowing Levels:  Level 1 (CN): NPO  Level 2 (CM): NPO but takes consistency in therapy  Level 3 (CL): Takes less than 50% of nutrition p.o. and continues with nonoral feedings; and/or safe with mod cues; and/or max diet restriction  Level 4 (CK): Safe swallow but needs mod cues; and/or mod diet restriction; and/or still requires some nonoral feeding/supplements  Level 5 (CJ): Safe swallow with min diet restriction; and/or needs min cues  Level 6 (CI): Independent with p.o.; rare cues; usually self cues; may need to avoid some foods or needs extra time  Level 7 ([address] [place] [address]): Independent for all p.o.  TAVARES. (2003). National Outcomes Measurement System (NOMS): Adult Speech-Language Pathology User's Guide. Pain:             After treatment:   Patient left in no apparent distress in bed, Call bell within reach, and Nursing notified    COMMUNICATION/EDUCATION:   Patient was educated regarding his deficit(s) of WFL swallow as this relates to his diagnosis of acute R pontine infarct. He demonstrated good understanding as evidenced by verbalizing understanding.     The patient's plan of care including recommendations, planned interventions, and recommended diet changes were discussed with: Registered nurse.      Fadi Laurent SLP  Time Calculation: 10 mins

## 2022-01-07 NOTE — PROGRESS NOTES
Transition of Care (SPENCER) Plan:  Patient is being discharged home to be followed by [facility]. They will be arranging outpatient PT for the patient as well. RUR: 7%    [facility]ENCER Transportation:       How is patient being transported at discharge? POV/Family     When?1/7/22     Is transport scheduled? Follow-up appointment and transportation:     PCP? Eric Marsh, [facility]     Specialist?     Time/Date? [address] will be calling the patient with a f/u date and time. Who is transporting to the follow-up appointment? Family      Is transport for follow up appointment scheduled? NA    Communication plan (with patient/family): Who is being called? Patient       What number(s) is to be used? [phone]      What service provider is calling for [facility] services? [address]      When are they calling? 24-48 hours prior to appointment      Click here to [address] including selection of the Healthcare Decision Maker Relationship (ie \"Primary\")  [email or web address]      Patient will need rehab for PT but unable to arrange with home health or outpatient rehab because he does not have a PCP. Patient receives all of his care at the [facility] and does not know the name of his PCP there. He goes to the [address]. Contacted Malena Davidson, [facility] Coordinator was informed about the issue and she will reach out to the patient's PCP, Eric Marsh and the will see about getting him an appointment soon. Jay Rodriguez is going to check on what is involved with OP PT and get back with me. Jay Rodriguez contacted me and stated that she was going to send a message to the patient's  and his PCP regarding the need for outpatient PT. Medical updates faxed to the [facility]: [phone]. Care Management Interventions  PCP Verified by CM:  Yes (Mr. Satish Ryan [facility] [place] primary care))  Palliative Care Criteria Met (RRAT>21 & CHF Dx)?: No  Mode of Transport at Discharge:  Other (see comment) (POV/Family)  Transition of Care Consult (CM Consult): Discharge Planning  MyChart Signup: No  Discharge Durable Medical Equipment: No  Physical Therapy Consult: Yes  Occupational Therapy Consult: Yes  Speech Therapy Consult: Yes  Support Systems: Spouse/Significant Other,Other Family Member(s)  Confirm Follow Up Transport: Self   Resource Information Provided?: No  Discharge Location  Discharge Placement: Home with outpatient services (Akash Carter will be arranging the patient's outpatient PT per Chapin Seen)

## 2022-01-07 NOTE — DISCHARGE SUMMARY
Admit date: 1/4/2022   Admitting Provider: Trent Mejia MD    Discharge date: 1/7/2022  Discharging Provider: Rosmery Saunders,       * Admission Diagnoses: Stroke [facility]) [I63.9]  Cerebrovascular accident (CVA) involving right cerebral hemisphere [facility]) [I63.9]    * Discharge Diagnoses:    Hospital Problems as of 1/7/2022 Date Reviewed: 1/4/2022          Codes Class Noted - Resolved POA    Cerebrovascular accident (CVA) involving right cerebral hemisphere [facility]) ICD-10-CM: I63.9  ICD-9-CM: 434.91  1/5/2022 - Present Unknown        * (Principal) Stroke [facility]) ICD-10-CM: I63.9  ICD-9-CM: 434.91  1/4/2022 - Present Yes        History of prostate cancer ICD-10-CM: Z85.46  ICD-9-CM: V10.46  3/12/2018 - Present Yes        Essential hypertension ICD-10-CM: I10  ICD-9-CM: 401.9  3/12/2018 - Present Yes              HPI & Hosp course:  Presented with inbalance and facial droop and slurred speech, acute onset. Was not TPA candidate due to outside window. MRI = R acute ischemic stroke. CTA no significant limiting lesion. Echo no emboli. Normal EF. On statin and Plavix(aspirin allergy). BP been high. Meds have been adjusted. Last 24 hours improving, today within acceptable range on current meds. Symptoms slowlyimproving since admission. PT rec home therapy. Prescribed amlodipine (new), DC previous varapamil, toprol xl, and statin, and plavix    Problem list:  Acute ischemic CVA  HTN essential  HTN urgency    >Time spent discharging pt: greater than 30 mins. Pt was instructed to return to ED immediately should symptoms worsen or not completely resolve. I advised the pt or the pt's listed family member not to operate machinery or weapons or drive motor vehicles for the duration of the prescribed course of any sedatives including benzos and opiates and anti histamines and muscle relaxants, and the pt or listed family member verbalized understanding.   Pt or listed family member were explained potential serious bleeding risk and death from bleeding with any blood thinners received in the hospital or after discharge and they verbalized understanding of inherent risk and agreed with blood thinners given that benefits outweight risk. Pt has been advised not to operate machinery or motor vehicles until cleared to do so by the primary care physician or another physician. Exam:  GENERAL: AAOx 3. No acute distress. Well-nourished. EYES: EOMI. Anicteric. HENT: Moist mucous membranes. No scleral icterus. No lymphadenopathy. LUNGS: CTA B/L. No accessory muscle use. CARDIOVASCULAR: Regular rate and rhythm. No murmur. No JVD. ABDOMEN: Soft, non-tender and non-distended. No palpable masses. EXTREMITIES: No edema. Non-tender. SKIN: No rashes or lesions. Warm. NEUROLOGIC: mild to moderate facial droop and unilateral weakness. Speaking clearly. PSYCHIATRIC: Cooperative. Appropriate mood and affect. * Discharge Condition: good  * Disposition: Home    Discharge Medications:  Current Discharge Medication List      START taking these medications    Details   amLODIPine (NORVASC) 5 mg tablet Take 1 Tablet by mouth nightly for 30 days. Qty: 30 Tablet, Refills: 0  Start date: 1/7/2022, End date: 2/6/2022      atorvastatin (LIPITOR) 40 mg tablet Take 1 Tablet by mouth daily (after dinner) for 30 days. Qty: 30 Tablet, Refills: 0  Start date: 1/7/2022, End date: 2/6/2022      clopidogreL (PLAVIX) 75 mg tab Take 1 Tablet by mouth daily for 30 days. Qty: 30 Tablet, Refills: 0  Start date: 1/7/2022, End date: 2/6/2022      metoprolol succinate (TOPROL-XL) 25 mg XL tablet Take 1 Tablet by mouth daily for 30 days. Qty: 30 Tablet, Refills: 0  Start date: 1/8/2022, End date: 2/7/2022         CONTINUE these medications which have NOT CHANGED    Details   MEN'S MULTI-VITAMIN PO Take 1 Tablet by mouth daily. acetaminophen (Tylenol Arthritis Pain) 650 mg TbER Take 1,300 mg by mouth every twelve (12) hours.  ( 650 mg ii tabs BID) Indications: pain associated with arthritis      diphenhydrAMINE (Benadryl Allergy) 25 mg tablet Take 25 mg by mouth two (2) times a day. STOP taking these medications       verapamil ER (VERELAN) 120 mg ER capsule Comments:   Reason for Stopping:               * Follow-up Care/Patient Instructions:   Activity: Activity as tolerated  Diet: Regular Diet  Wound Care: Keep wound clean and dry    Follow-up Information     Follow up With Specialties Details Why Contact Info    None    None ([phone]) Patient stated that they have no PCP            Signed:  Blaze Trejo DO  1/7/2022  11:38 AM .

## 2022-01-07 NOTE — PROGRESS NOTES
[facility]  Hospitalist Progress Note    NAME: Tony Montoya   :  1955   MRN:  [id number]     Total duration of encounter: 2 days      Interim Hospital Summary: 77 y.o. male who presented on 2022 with Stroke [facility]). He has a past medical history of Hypertension. . Admitted for evaluation of balance c/o,  tendency to lean / walk to the L side. Onset noted Monday AM.  Was restless on Sun night. Pt presented to ED on Tue, since symptoms persisted and the weather had cleared. Exam noted for mild L LE weakness compared to R.  UE are symmetric. Has been given Plavix daily. MRI confirmed a R radha acute infarct. Pt feels the balance problem is better. Plan is for d/c home with [facility] with PT/OT when BP stable. Adjusting BP meds. Subjective:     Chief Complaint / Reason for Physician Visit  \"no c/o\".   Discussed with RN   Was able to work with PT - ambulated  D/c recommendation was home with [facility] / PT / OT  BP remains elevated    Review of Systems:  Symptom Y/N Comments  Symptom Y/N Comments   Fever/Chills n   Chest Pain n    Poor Appetite n   Edema n    Cough n   Abdominal Pain n    Sputum n   Joint Pain n    SOB/BAILEY n   Pruritis/Rash n    Nausea/vomit n   Tolerating PT/OT n    Diarrhea n   Tolerating Diet     Constipation n   Other         Current Facility-Administered Medications:     amLODIPine (NORVASC) tablet 5 mg, 5 mg, Oral, QHS, Merline Keel, MD    [START ON 2022] metoprolol succinate (TOPROL-XL) XL tablet 25 mg, 25 mg, Oral, DAILY, Merline Keel, MD    atorvastatin (LIPITOR) tablet 40 mg, 40 mg, Oral, PCD, Merline Keel, MD, 40 mg at 22 1845    clopidogreL (PLAVIX) tablet 75 mg, 75 mg, Oral, DAILY, Merline Keel, MD, 75 mg at 22 1005    sodium chloride (NS) flush 5-40 mL, 5-40 mL, IntraVENous, Q8H, Merline Keel, MD, 10 mL at 22 1305    sodium chloride (NS) flush 5-40 mL, 5-40 mL, IntraVENous, PRN, Merline Keel, MD    polyethylene glycol [facility]) packet 17 g, 17 g, Oral, DAILY PRN, Corrina Carvajal MD    ondansetron (ZOFRAN ODT) tablet 4 mg, 4 mg, Oral, Q8H PRN **OR** ondansetron (ZOFRAN) injection 4 mg, 4 mg, IntraVENous, Q6H PRN, Corrina Carvajal MD    enoxaparin (LOVENOX) injection 40 mg, 40 mg, SubCUTAneous, DAILY, Corrina Carvajal MD, 40 mg at 01/06/22 1005    famotidine (PEPCID) tablet 20 mg, 20 mg, Oral, QPM, Corrina Carvajal MD, 20 mg at 01/06/22 1845    Objective:     VITALS:   Patient Vitals for the past 12 hrs:   Temp Pulse Resp BP SpO2   01/06/22 2000 97.6 °F (36.4 °C) 77 18 135/87 97 %   01/06/22 1620 98.5 °F (36.9 °C) 68 18 (!) 144/56 98 %   01/06/22 1100 97.6 °F (36.4 °C) 80 20 124/72 96 %       Intake/Output Summary (Last 24 hours) at 1/6/2022 2155  Last data filed at 1/6/2022 1605  Gross per 24 hour   Intake 240 ml   Output 1875 ml   Net -1635 ml        PHYSICAL EXAM:  General: WD, WN. Alert, cooperative, no acute distress. Lying in bed  EENT:  EOMI. Anicteric sclerae. MMM  Resp:  CTA bilaterally, no wheezing or rales. No accessory muscle use  CV:  Regular  rhythm,  No edema  GI:  Soft, Non distended, Non tender. +Bowel sounds  Neurologic:  Alert and oriented X 3, normal speech, nonfocal   Psych:   Not anxious nor agitated  Skin:  No rashes. No jaundice    LABS:  I reviewed today's most current labs and imaging studies. Pertinent labs include:  Recent Labs     01/05/22  0520 01/04/22  1205   WBC 5.1 6.2   HGB 12.1 13.0   HCT 36.8 40.1    331     Recent Labs     01/05/22  0520 01/04/22  1205    142   K 3.8 4.0    106   CO2 25 25   GLU 93 117*   BUN 22* 23*   CREA 1.14 1.16   CA 8.9 9.4   MG 2.2  --    ALB  --  3.8   TBILI  --  0.5   ALT  --  24   INR  --  1.1       RADIOLOGY:  CT HEAD 1/4:  No calvarial abnormalities are detected. Abnormal soft tissue density is noted  within the maxillary sinuses. Very mild deviation of the midportion of the nasal  septum towards the left is present.  There is no evidence of intracranial hemorrhage.   IMPRESSION  1. No evidence of intracranial hemorrhage  2. Presence of bilateral maxillary sinus disease. CTA HEAD / NECK 1/4:  CTA Head: No evidence of flow-limiting stenosis or aneurysm. Scattered atherosclerotic  disease as above.   CTA Neck: No evidence of significant stenosis. PCXR 1/4: ordered for AM 1/7    ECHO 1/5:    Left Ventricle: Left ventricle size is normal. Mildly increased wall thickness. Normal wall motion. Normal left ventricular systolic function with a visually estimated EF of 65 - 70%. Normal diastolic function. MRI 1/5:  Acute infarct in the right paramedian radha.   The ventricles are normal in size and position. Few scattered periventricular  and deep white matter T2/FLAIR hyperintensities, most prominent in the  periatrial regions, consistent with minimal chronic microvascular ischemic  disease. There is no acute hemorrhage, extra-axial fluid collection, or mass  effect. There is no cerebellar tonsillar herniation. Expected arterial  flow-voids are present.   Scattered mucosal thickening in the bilateral maxillary sinuses without  air-fluid level. Small left mastoid effusion. The orbital contents are within  normal limits. No significant osseous or scalp lesions are identified.   IMPRESSION: Acute infarct in the right paramedian radha. EKG: NSR 73 bpm, NSTWC, No prev    Procedures: see electronic medical records for all procedures/Xrays and details which were not copied into this note but were reviewed prior to creation of Plan.         Assessment / Plan:  Principal Problem:    Stroke [facility]) (1/4/2022)  Apparent R cerebral event with L sided findings  MRI / ECHO results noted for Acute infarct in the right paramedian radha.   CT and CTA noted and reviewed by Tele-Neurology  To continue Plavix 75mg daily (ASA allergy)  Adjust Lipid tx Lipitor  BP tx swithed from Verapamil tid to Norvasc qhs / Metoprolol XL qam  PT/OT/ST consults  Discharge planning - arrange for [facility] with PT/OT  BMP/Hg in AM     Active Problems:    History of prostate cancer (3/12/2018)  Tx 2015. Radical prostatectomy with residual incontinence / wears depends.       Essential hypertension (3/12/2018)  Discontinue Verapamil 60mg tid  Follow, longterm control with Sys below 140  Begin Norvasc 5mg hs and Toprol XL 25mg AM     SAFETY:   Code Status:Full  DVT prophylaxis:Lovenox  Stress Ulcer prophylaxis: Pepcid  Bladder catheter:no  Family Contact Info:  Primary Emergency Contact: [address], Home Phone: [phone]  Bedded: [facility] Room 118/01  Disposition: TBD, likely home with HH/PT/OT. Has family at home to assist him  Admission status:   Inpt per Utilization Review      Reviewed most current lab test results and cultures  YES  Reviewed most current radiology test results   YES  Review and summation of old records today    NO  Reviewed patient's current orders and MAR    YES  PMH/SH reviewed - no change compared to H&P    Care Plan discussed with:                                   Comments  Patient x     Family  x    RN x     Care Manager  x     Consultant                           x Multidiciplinary team rounds were held today with , nursing, pharmacist and clinical coordinator. Patient's plan of care was discussed; medications were reviewed and discharge planning was addressed.         ____________________________________________    Total NON Critical Care TIME:  35   Minutes        Comments   >50% of visit spent in counseling and coordination of care   x      Signed: Juliana Washington MD  [facility] Hospitalist  [phone]

## 2022-01-07 NOTE — PROGRESS NOTES
Bedside shift change report given to Eric Fine RN (oncoming nurse) by Kaylan Flor RN (offgoing nurse). Report included the following information Kardex, Intake/Output and Recent Results.

## 2022-01-08 LAB
ATRIAL RATE: 73 BPM
CALCULATED P AXIS, ECG09: 42 DEGREES
CALCULATED R AXIS, ECG10: -2 DEGREES
CALCULATED T AXIS, ECG11: -49 DEGREES
DIAGNOSIS, 93000: NORMAL
P-R INTERVAL, ECG05: 156 MS
Q-T INTERVAL, ECG07: 374 MS
QRS DURATION, ECG06: 76 MS
QTC CALCULATION (BEZET), ECG08: 412 MS
VENTRICULAR RATE, ECG03: 73 BPM

## 2022-01-10 VITALS
RESPIRATION RATE: 20 BRPM | SYSTOLIC BLOOD PRESSURE: 146 MMHG | HEIGHT: 67 IN | OXYGEN SATURATION: 97 % | WEIGHT: 185.4 LBS | HEART RATE: 92 BPM | DIASTOLIC BLOOD PRESSURE: 85 MMHG | BODY MASS INDEX: 29.1 KG/M2 | TEMPERATURE: 98.2 F

## 2022-01-11 NOTE — PROGRESS NOTES
Discharge instructions reviewed with patient  Personal belongings returned: patient  Home meds returned: none  To ED entrance via wheelchair  Discharged home with  Discharge papers and all follow up appointments.

## 2022-02-22 ENCOUNTER — OFFICE VISIT (OUTPATIENT)
Dept: FAMILY MEDICINE CLINIC | Age: 67
End: 2022-02-22
Payer: MEDICARE

## 2022-02-22 VITALS
WEIGHT: 194.2 LBS | SYSTOLIC BLOOD PRESSURE: 137 MMHG | BODY MASS INDEX: 30.48 KG/M2 | RESPIRATION RATE: 16 BRPM | DIASTOLIC BLOOD PRESSURE: 86 MMHG | HEIGHT: 67 IN | OXYGEN SATURATION: 98 % | HEART RATE: 87 BPM | TEMPERATURE: 97.3 F

## 2022-02-22 DIAGNOSIS — I63.50 RIGHT PONTINE CVA (HCC): ICD-10-CM

## 2022-02-22 DIAGNOSIS — Z02.4 ENCOUNTER FOR CDL (COMMERCIAL DRIVING LICENSE) EXAM: Primary | ICD-10-CM

## 2022-02-22 DIAGNOSIS — I10 ESSENTIAL HYPERTENSION: ICD-10-CM

## 2022-02-22 LAB
BILIRUB UR QL STRIP: NEGATIVE
GLUCOSE UR-MCNC: NEGATIVE MG/DL
KETONES P FAST UR STRIP-MCNC: NORMAL MG/DL
PH UR STRIP: 5.5 [PH] (ref 4.6–8)
PROT UR QL STRIP: NEGATIVE
SP GR UR STRIP: 1.03 (ref 1–1.03)
UA UROBILINOGEN AMB POC: NORMAL (ref 0.2–1)
URINALYSIS CLARITY POC: CLEAR
URINALYSIS COLOR POC: YELLOW
URINE BLOOD POC: NEGATIVE
URINE LEUKOCYTES POC: NEGATIVE
URINE NITRITES POC: NEGATIVE

## 2022-02-22 PROCEDURE — G8536 NO DOC ELDER MAL SCRN: HCPCS | Performed by: FAMILY MEDICINE

## 2022-02-22 PROCEDURE — G8754 DIAS BP LESS 90: HCPCS | Performed by: FAMILY MEDICINE

## 2022-02-22 PROCEDURE — G8427 DOCREV CUR MEDS BY ELIG CLIN: HCPCS | Performed by: FAMILY MEDICINE

## 2022-02-22 PROCEDURE — G8752 SYS BP LESS 140: HCPCS | Performed by: FAMILY MEDICINE

## 2022-02-22 PROCEDURE — 81002 URINALYSIS NONAUTO W/O SCOPE: CPT | Performed by: FAMILY MEDICINE

## 2022-02-22 PROCEDURE — G9711 PT HX TOT COL OR COLON CA: HCPCS | Performed by: FAMILY MEDICINE

## 2022-02-22 PROCEDURE — 1101F PT FALLS ASSESS-DOCD LE1/YR: CPT | Performed by: FAMILY MEDICINE

## 2022-02-22 PROCEDURE — G8510 SCR DEP NEG, NO PLAN REQD: HCPCS | Performed by: FAMILY MEDICINE

## 2022-02-22 PROCEDURE — G8417 CALC BMI ABV UP PARAM F/U: HCPCS | Performed by: FAMILY MEDICINE

## 2022-02-22 PROCEDURE — 99387 INIT PM E/M NEW PAT 65+ YRS: CPT | Performed by: FAMILY MEDICINE

## 2022-02-22 RX ORDER — AMLODIPINE BESYLATE 10 MG/1
5 TABLET ORAL DAILY
COMMUNITY

## 2022-02-22 RX ORDER — CLOPIDOGREL BISULFATE 75 MG/1
75 TABLET ORAL DAILY
COMMUNITY

## 2022-02-22 RX ORDER — ATORVASTATIN CALCIUM 80 MG/1
40 TABLET, FILM COATED ORAL DAILY
COMMUNITY

## 2022-02-22 RX ORDER — METOPROLOL SUCCINATE 50 MG/1
25 TABLET, EXTENDED RELEASE ORAL DAILY
COMMUNITY

## 2022-02-22 NOTE — PROGRESS NOTES
2/22/2022      Chief Complaint   Patient presents with    Employment Physical     CDL               History of Present Illness:         is a 79 y.o. 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 No seizures, no residual.      Allergies   Allergen Reactions    Asa-Acetaminophen-Caff-Buffers Unknown (comments)       Current Outpatient Medications   Medication Sig    clopidogreL (PLAVIX) 75 mg tab Take 75 mg by mouth daily.  metoprolol succinate (TOPROL-XL) 50 mg XL tablet Take 25 mg by mouth daily.  amLODIPine (NORVASC) 10 mg tablet Take 5 mg by mouth daily.  atorvastatin (LIPITOR) 80 mg tablet Take 40 mg by mouth daily.  MEN'S MULTI-VITAMIN PO Take 1 Tablet by mouth daily. (Patient not taking: Reported on 2/22/2022)    acetaminophen (Tylenol Arthritis Pain) 650 mg TbER Take 1,300 mg by mouth every twelve (12) hours. ( 650 mg ii tabs BID)   Indications: pain associated with arthritis (Patient not taking: Reported on 2/22/2022)    diphenhydrAMINE (Benadryl Allergy) 25 mg tablet Take 25 mg by mouth two (2) times a day. (Patient not taking: Reported on 2/22/2022)     No current facility-administered medications for this visit. Physical Examination:    Visit Vitals  /86 (BP 1 Location: Left upper arm, BP Patient Position: Sitting, BP Cuff Size: Large adult)   Pulse 87   Temp 97.3 °F (36.3 °C) (Temporal)   Resp 16   Ht 5' 6.5\" (1.689 m)   Wt 194 lb 3.2 oz (88.1 kg)   SpO2 98%   BMI 30.88 kg/m²      General:  Alert, cooperative, no distress. HEENT:  Normocephalic, without obvious abnormality, atraumatic. Conjunctivae/corneas clear. Pupils equal, round, reactive to light. Extraocular movements intact. TMs and external canals normal bilaterally. Nasal mucosa and oropharynx clear. Lungs: Clear to auscultation bilaterally. Chest wall:  No tenderness or deformity.    Heart:  Regular rate and rhythm, S1, S2 normal, no murmur, click, rub, or gallop. Abdomen:   Soft, non-tender. Bowel sounds normal. No masses. No organomegaly. Extremities: Extremities normal, atraumatic, no cyanosis or edema. Pulses: 2+ and symmetric all extremities. Skin: Skin color, texture, turgor normal. No rashes or lesions. Lymph nodes: Cervical, supraclavicular, and axillary nodes normal.   Neurologic: CNII-XII intact. Normal strength, sensation, and reflexes throughout. Results for orders placed or performed in visit on 02/22/22   AMB POC URINALYSIS DIP STICK MANUAL W/O MICRO   Result Value Ref Range    Color (UA POC) Yellow     Clarity (UA POC) Clear     Glucose (UA POC) Negative Negative    Bilirubin (UA POC) Negative Negative    Ketones (UA POC) Trace Negative    Specific gravity (UA POC) 1.030 1.001 - 1.035    Blood (UA POC) Negative Negative    pH (UA POC) 5.5 4.6 - 8.0    Protein (UA POC) Negative Negative    Urobilinogen (UA POC) 0.2 mg/dL 0.2 - 1    Nitrites (UA POC) Negative Negative    Leukocyte esterase (UA POC) Negative Negative       ASSESSMENT AND PLAN    1. Encounter for CDL (commercial driving license) exam  One year waiting period for brainstem ischemic CVA. Not certified  - AMB POC URINALYSIS DIP STICK MANUAL W/O MICRO    2. Right pontine CVA ([address]  stable    3. Essential hypertension  Good control          Orders Placed This Encounter    clopidogreL (PLAVIX) 75 mg tab     Sig: Take 75 mg by mouth daily.  metoprolol succinate (TOPROL-XL) 50 mg XL tablet     Sig: Take 25 mg by mouth daily.  amLODIPine (NORVASC) 10 mg tablet     Sig: Take 5 mg by mouth daily.  atorvastatin (LIPITOR) 80 mg tablet     Sig: Take 40 mg by mouth daily.            Arely Burton MD

## 2022-03-18 PROBLEM — I10 ESSENTIAL HYPERTENSION: Status: ACTIVE | Noted: 2018-03-12

## 2022-03-19 PROBLEM — Z85.46 HISTORY OF PROSTATE CANCER: Status: ACTIVE | Noted: 2018-03-12

## 2022-03-19 PROBLEM — I63.9 CEREBROVASCULAR ACCIDENT (CVA) INVOLVING RIGHT CEREBRAL HEMISPHERE (HCC): Status: ACTIVE | Noted: 2022-01-05

## 2022-03-20 PROBLEM — I63.50 RIGHT PONTINE CVA (HCC): Status: ACTIVE | Noted: 2022-01-01

## 2022-03-20 PROBLEM — I63.9 STROKE (HCC): Status: ACTIVE | Noted: 2022-01-04

## 2023-01-16 ENCOUNTER — OFFICE VISIT (OUTPATIENT)
Dept: INTERNAL MEDICINE CLINIC | Age: 68
End: 2023-01-16

## 2023-01-16 VITALS
WEIGHT: 195 LBS | TEMPERATURE: 98.6 F | HEIGHT: 67 IN | RESPIRATION RATE: 16 BRPM | HEART RATE: 71 BPM | OXYGEN SATURATION: 98 % | BODY MASS INDEX: 30.61 KG/M2 | DIASTOLIC BLOOD PRESSURE: 76 MMHG | SYSTOLIC BLOOD PRESSURE: 131 MMHG

## 2023-01-16 DIAGNOSIS — Z02.89 ENCOUNTER FOR OCCUPATIONAL PHYSICAL EXAMINATION: Primary | ICD-10-CM

## 2023-01-16 DIAGNOSIS — I10 ESSENTIAL HYPERTENSION: ICD-10-CM

## 2023-01-16 LAB
BILIRUB UR QL STRIP: NEGATIVE
GLUCOSE UR-MCNC: NEGATIVE MG/DL
KETONES P FAST UR STRIP-MCNC: NEGATIVE MG/DL
PH UR STRIP: 7 [PH] (ref 4.6–8)
PROT UR QL STRIP: NEGATIVE
SP GR UR STRIP: 1.02 (ref 1–1.03)
UA UROBILINOGEN AMB POC: NORMAL (ref 0.2–1)
URINALYSIS CLARITY POC: CLEAR
URINALYSIS COLOR POC: YELLOW
URINE BLOOD POC: NEGATIVE
URINE LEUKOCYTES POC: NEGATIVE
URINE NITRITES POC: NEGATIVE

## 2023-01-16 PROCEDURE — 99901 DOT PHYSICAL EXAM: CPT | Performed by: FAMILY MEDICINE

## 2023-01-16 PROCEDURE — 81003 URINALYSIS AUTO W/O SCOPE: CPT | Performed by: FAMILY MEDICINE

## 2023-01-16 NOTE — PROGRESS NOTES
Chief Complaint   Patient presents with    Physical     DOT     Patient has not been out of the country in (14 months), NO diarrhea, NO cough, NO chest conjestion, NO temp. Pt has not been around anyone with these symptoms. Health Maintenance reviewed. I have reviewed the patient's medical history in detail and updated the computerized patient record. 1. \"Have you been to the ER, urgent care clinic since your last visit? No  Hospitalized since your last visit? \" no    2. \"Have you seen or consulted any other health care providers outside of the [address] [place] [address] since your last visit? \"  no    3. For patients aged 39-70: Has the patient had a colonoscopy / FIT/ Cologuard? no    Encouraged pt to discuss pt's wishes with spouse/partner/family and bring them in the next appt to follow thru with the Advanced Directive    @  Fall Risk Assessment, last 12 mths 1/16/2023   Able to walk? Yes   Fall in past 12 months? 0   Do you feel unsteady? 0   Are you worried about falling 0       3 most recent PHQ Screens 1/16/2023   PHQ Not Done -   Little interest or pleasure in doing things Several days   Feeling down, depressed, irritable, or hopeless Several days   Total Score PHQ 2 2       Abuse Screening Questionnaire 1/16/2023   Do you ever feel afraid of your partner? N   Are you in a relationship with someone who physically or mentally threatens you? N   Is it safe for you to go home?  Y       ADL Assessment 1/16/2023   Feeding yourself No Help Needed   Getting from bed to chair No Help Needed   Getting dressed No Help Needed   Bathing or showering No Help Needed   Walk across the room (includes cane/walker) No Help Needed   Using the telphone No Help Needed   Taking your medications No Help Needed   Preparing meals No Help Needed   Managing money (expenses/bills) No Help Needed   Moderately strenuous housework (laundry) No Help Needed   Shopping for personal items (toiletries/medicines) No Help Needed Shopping for groceries No Help Needed   Driving No Help Needed   Climbing a flight of stairs No Help Needed   Getting to places beyond walking distances No Help Needed

## 2023-05-26 RX ORDER — METOPROLOL SUCCINATE 50 MG/1
25 TABLET, EXTENDED RELEASE ORAL DAILY
COMMUNITY

## 2023-05-26 RX ORDER — ATORVASTATIN CALCIUM 80 MG/1
40 TABLET, FILM COATED ORAL DAILY
COMMUNITY

## 2023-05-26 RX ORDER — AMLODIPINE BESYLATE 10 MG/1
5 TABLET ORAL DAILY
COMMUNITY

## 2023-05-26 RX ORDER — CLOPIDOGREL BISULFATE 75 MG/1
75 TABLET ORAL DAILY
COMMUNITY

## 2023-05-26 RX ORDER — SENNOSIDES 8.6 MG
1300 CAPSULE ORAL EVERY 12 HOURS
COMMUNITY

## 2023-07-06 ENCOUNTER — HOSPITAL ENCOUNTER (EMERGENCY)
Facility: HOSPITAL | Age: 68
Discharge: HOME | End: 2023-07-06
Attending: EMERGENCY MEDICINE
Payer: OTHER GOVERNMENT

## 2023-07-06 ENCOUNTER — APPOINTMENT (OUTPATIENT)
Facility: HOSPITAL | Age: 68
End: 2023-07-06
Payer: OTHER GOVERNMENT

## 2023-07-06 VITALS
HEIGHT: 67 IN | OXYGEN SATURATION: 99 % | BODY MASS INDEX: 30.29 KG/M2 | TEMPERATURE: 98.7 F | DIASTOLIC BLOOD PRESSURE: 98 MMHG | SYSTOLIC BLOOD PRESSURE: 153 MMHG | HEART RATE: 83 BPM | RESPIRATION RATE: 18 BRPM | WEIGHT: 193 LBS

## 2023-07-06 DIAGNOSIS — N20.0 KIDNEY STONE: Primary | ICD-10-CM

## 2023-07-06 DIAGNOSIS — R31.0 GROSS HEMATURIA: ICD-10-CM

## 2023-07-06 LAB
ALBUMIN SERPL-MCNC: 3.8 G/DL (ref 3.5–5)
ALBUMIN/GLOB SERPL: 1.3 (ref 1.1–2.2)
ALP SERPL-CCNC: 76 U/L (ref 45–117)
ALT SERPL-CCNC: 24 U/L (ref 12–78)
ANION GAP SERPL CALC-SCNC: 10 MMOL/L (ref 5–15)
APPEARANCE UR: ABNORMAL
AST SERPL-CCNC: 22 U/L (ref 15–37)
BACTERIA URNS QL MICRO: NEGATIVE /HPF
BASOPHILS # BLD: 0 K/UL (ref 0–0.1)
BASOPHILS NFR BLD: 0 % (ref 0–1)
BILIRUB SERPL-MCNC: 0.4 MG/DL (ref 0.2–1)
BILIRUB UR QL: NEGATIVE
BUN SERPL-MCNC: 22 MG/DL (ref 6–20)
BUN/CREAT SERPL: 20 (ref 12–20)
CALCIUM SERPL-MCNC: 9.2 MG/DL (ref 8.5–10.1)
CHLORIDE SERPL-SCNC: 107 MMOL/L (ref 97–108)
CK SERPL-CCNC: 141 U/L (ref 39–308)
CO2 SERPL-SCNC: 27 MMOL/L (ref 21–32)
COLOR UR: ABNORMAL
CREAT SERPL-MCNC: 1.11 MG/DL (ref 0.7–1.3)
DIFFERENTIAL METHOD BLD: ABNORMAL
EOSINOPHIL # BLD: 0 K/UL (ref 0–0.4)
EOSINOPHIL NFR BLD: 1 % (ref 0–7)
EPITH CASTS URNS QL MICRO: ABNORMAL /LPF
ERYTHROCYTE [DISTWIDTH] IN BLOOD BY AUTOMATED COUNT: 14 % (ref 11.5–14.5)
GLOBULIN SER CALC-MCNC: 2.9 G/DL (ref 2–4)
GLUCOSE SERPL-MCNC: 167 MG/DL (ref 65–100)
GLUCOSE UR STRIP.AUTO-MCNC: NEGATIVE MG/DL
HCT VFR BLD AUTO: 37.2 % (ref 36.6–50.3)
HGB BLD-MCNC: 11.9 G/DL (ref 12.1–17)
HGB UR QL STRIP: ABNORMAL
IMM GRANULOCYTES # BLD AUTO: 0 K/UL (ref 0–0.04)
IMM GRANULOCYTES NFR BLD AUTO: 1 % (ref 0–0.5)
KETONES UR QL STRIP.AUTO: NEGATIVE MG/DL
LEUKOCYTE ESTERASE UR QL STRIP.AUTO: NEGATIVE
LYMPHOCYTES # BLD: 1.8 K/UL (ref 0.8–3.5)
LYMPHOCYTES NFR BLD: 33 % (ref 12–49)
MCH RBC QN AUTO: 26.8 PG (ref 26–34)
MCHC RBC AUTO-ENTMCNC: 32 G/DL (ref 30–36.5)
MCV RBC AUTO: 83.8 FL (ref 80–99)
MONOCYTES # BLD: 0.3 K/UL (ref 0–1)
MONOCYTES NFR BLD: 6 % (ref 5–13)
NEUTS SEG # BLD: 3.4 K/UL (ref 1.8–8)
NEUTS SEG NFR BLD: 59 % (ref 32–75)
NITRITE UR QL STRIP.AUTO: NEGATIVE
NRBC # BLD: 0 K/UL (ref 0–0.01)
NRBC BLD-RTO: 0 PER 100 WBC
PH UR STRIP: 6.5 (ref 5–8)
PLATELET # BLD AUTO: 265 K/UL (ref 150–400)
PMV BLD AUTO: 8.9 FL (ref 8.9–12.9)
POTASSIUM SERPL-SCNC: 4.1 MMOL/L (ref 3.5–5.1)
PROT SERPL-MCNC: 6.7 G/DL (ref 6.4–8.2)
PROT UR STRIP-MCNC: 100 MG/DL
RBC # BLD AUTO: 4.44 M/UL (ref 4.1–5.7)
RBC #/AREA URNS HPF: ABNORMAL /HPF (ref 0–5)
SODIUM SERPL-SCNC: 144 MMOL/L (ref 136–145)
SP GR UR REFRACTOMETRY: 1.02 (ref 1–1.03)
URINE CULTURE IF INDICATED: ABNORMAL
UROBILINOGEN UR QL STRIP.AUTO: 1 EU/DL (ref 0.2–1)
WBC # BLD AUTO: 5.6 K/UL (ref 4.1–11.1)
WBC URNS QL MICRO: ABNORMAL /HPF (ref 0–4)

## 2023-07-06 PROCEDURE — 36415 COLL VENOUS BLD VENIPUNCTURE: CPT

## 2023-07-06 PROCEDURE — 99284 EMERGENCY DEPT VISIT MOD MDM: CPT

## 2023-07-06 PROCEDURE — 81001 URINALYSIS AUTO W/SCOPE: CPT

## 2023-07-06 PROCEDURE — 80053 COMPREHEN METABOLIC PANEL: CPT

## 2023-07-06 PROCEDURE — 85025 COMPLETE CBC W/AUTO DIFF WBC: CPT

## 2023-07-06 PROCEDURE — 74176 CT ABD & PELVIS W/O CONTRAST: CPT

## 2023-07-06 PROCEDURE — 82550 ASSAY OF CK (CPK): CPT

## 2023-07-06 RX ORDER — OXYCODONE HYDROCHLORIDE 5 MG/1
5 TABLET ORAL EVERY 6 HOURS PRN
Qty: 12 TABLET | Refills: 0 | Status: SHIPPED | OUTPATIENT
Start: 2023-07-06 | End: 2023-07-09

## 2023-07-06 RX ORDER — 0.9 % SODIUM CHLORIDE 0.9 %
500 INTRAVENOUS SOLUTION INTRAVENOUS ONCE
Status: DISCONTINUED | OUTPATIENT
Start: 2023-07-06 | End: 2023-07-06 | Stop reason: HOSPADM

## 2023-07-06 RX ORDER — ONDANSETRON 4 MG/1
4 TABLET, ORALLY DISINTEGRATING ORAL 3 TIMES DAILY PRN
Qty: 21 TABLET | Refills: 0 | Status: SHIPPED | OUTPATIENT
Start: 2023-07-06

## 2023-07-06 RX ORDER — TAMSULOSIN HYDROCHLORIDE 0.4 MG/1
0.4 CAPSULE ORAL DAILY
Qty: 7 CAPSULE | Refills: 0 | Status: SHIPPED | OUTPATIENT
Start: 2023-07-06

## 2023-07-06 ASSESSMENT — PAIN SCALES - GENERAL
PAINLEVEL_OUTOF10: 0
PAINLEVEL_OUTOF10: 0

## 2023-07-06 ASSESSMENT — LIFESTYLE VARIABLES
HOW OFTEN DO YOU HAVE A DRINK CONTAINING ALCOHOL: NEVER
HOW MANY STANDARD DRINKS CONTAINING ALCOHOL DO YOU HAVE ON A TYPICAL DAY: PATIENT DOES NOT DRINK

## 2023-07-06 ASSESSMENT — PAIN - FUNCTIONAL ASSESSMENT
PAIN_FUNCTIONAL_ASSESSMENT: 0-10
PAIN_FUNCTIONAL_ASSESSMENT: 0-10

## 2023-07-06 NOTE — ED NOTES
Bedside report received from AMBERLY Martinez. Patient awake and alert. NAD noted at this time. All safety precautions in place.Will continue to monitor.   I have reviewed discharge instructions with the patient and spouse. The patient and spouse verbalized understanding. Discharge medications discussed with patient. No questions at this time. Ambulated without difficulty.       Elkin Murray RN  07/06/23 0851

## 2023-07-06 NOTE — ED NOTES
Checked on patient, patient resting comfortably with call bell and bed rails up. Will continue to monitor.        Jose Mcdonnell RN  07/06/23 1181

## 2023-07-06 NOTE — DISCHARGE INSTRUCTIONS
There is a 3 mm stone in the left mid ureter and this is likely causing your blood in the urine. You should follow-up with urology it is not emergent and can be done through the [facility] so long as you are not having significant pain and discomfort or start running a fever. I would like for you to follow-up with your oncologist at the [facility] secondary to development of sclerotic lesion in the right iliac bone which could be concerning for the possibility of metastatic disease. You have been provided a copy of your films that were done today so they can review and compare to old films there.

## 2023-07-06 NOTE — ED TRIAGE NOTES
Pt arrived with c/o on and off hematuria that started last Friday.  Pt has no pain or burning with urination

## 2023-07-07 NOTE — ED PROVIDER NOTES
[facility] EMERGENCY DEP  EMERGENCY DEPARTMENT ENCOUNTER       Pt Name: Ruben Sheffield  MRN: [id number]  [address] [facility] 1955  Date of evaluation: 2023  Provider: Rinda Romberg, DO   PCP: Sharyle Gibbs  Note Started: 9:02 AM EDT 23     CHIEF COMPLAINT       Chief Complaint   Patient presents with    Hematuria        HISTORY OF PRESENT ILLNESS: 1 or more elements      History From: patient, History limited by: none     Ruben Sheffield is a 76 y.o. male presents to the emergency department secondary to hematuria. Patient states over the past several days he has been having intermittent gross hematuria. He states it is not with every time he urinates. He denies any pain or discomfort with it. He denies any nausea or vomiting. He denies any fever or chills. He denies any chest pain or shortness of breath. There has been no urinary frequency, urgency or pain with urination. Patient states that he does do a lot of work outside. Patient does have a history of prostate cancer is followed at the [facility]. Please See [facility] for Additional Details of the HPI/PMH  Nursing Notes were all reviewed and agreed with or any disagreements were addressed in the HPI. REVIEW OF SYSTEMS        Positives and Pertinent negatives as per HPI.     PAST HISTORY     Past Medical History:  Past Medical History:   Diagnosis Date    Essential hypertension     Right pontine CVA ([address]) 2022       Past Surgical History:  Past Surgical History:   Procedure Laterality Date    HERNIA REPAIR      PROSTATECTOMY         Family History:  Family History   Problem Relation Age of Onset    Dementia Mother        Social History:  Social History     Tobacco Use    Smoking status: Former     Types: Cigarettes     Quit date: 1988     Years since quittin.2    Smokeless tobacco: Never   Substance Use Topics    Alcohol use: No    Drug use: No       Allergies:  No Known Allergies    CURRENT MEDICATIONS      Discharge Medication List as of 2023

## 2023-08-27 NOTE — PROGRESS NOTES
DOT PHYSICAL    Keyana Diaz 79 y.o. presents for a DOT physical.  He has the following concerns: none not going to retire  No dizziness, syncope or fainting, exertional chest pain, excessive shortness of breath, focal weakness, abdominal pain, severe depressed mood, thoughts of suicide, recreational drug abuse, excessive alcohol usage. No excessive sleepiness in the day time falling asleep at the wheel or any motor vehicle accidents. No diabetes. No insulin or narcotic agents.   PCP [facility]    Patient Active Problem List   Diagnosis Code    History of prostate cancer Z85.46    Essential hypertension I10    Stroke ([facility] I63.9    Cerebrovascular accident (CVA) involving right cerebral hemisphere ([facility] I63.9    Right pontine CVA ([facility] I63.50     Past Medical History:   Diagnosis Date    Essential hypertension     Right pontine CVA ([facility] 2022     Past Surgical History:   Procedure Laterality Date    HX HERNIA REPAIR      HX PROSTATECTOMY       Social History     Socioeconomic History    Marital status:      Spouse name: Not on file    Number of children: 0    Years of education: Not on file    Highest education level: Not on file   Occupational History    Occupation: tractor trailer   Tobacco Use    Smoking status: Former     Types: Cigarettes     Quit date: 1988     Years since quittin.8    Smokeless tobacco: Never   Vaping Use    Vaping Use: Never used   Substance and Sexual Activity    Alcohol use: No    Drug use: No    Sexual activity: Yes     Partners: Female   Other Topics Concern    Not on file   Social History Narrative    Niece and mother lives with him     Social Determinants of Health     Financial Resource Strain: Not on file   Food Insecurity: Not on file   Transportation Needs: Not on file   Physical Activity: Not on file   Stress: Not on file   Social Connections: Not on file   Intimate Partner Violence: Not on file   Housing Stability: RD consulted per wound panel for sacral wound-per RN wound is healed. Also screened per [facility] for unsure wt loss-pt was on diuretics PTA reports no unintentional wt loss and good appetite PTA. Pt resides at assisted living, family have inquired if they offered low sodium meals however were told they do not. Family have been encouraging of pt to not choose higher sodium foods. Pt states she has been trying to consume less soup.   Not on file       Current Outpatient Medications   Medication Sig Dispense Refill    clopidogreL (PLAVIX) 75 mg tab Take 75 mg by mouth daily. metoprolol succinate (TOPROL-XL) 50 mg XL tablet Take 25 mg by mouth daily. amLODIPine (NORVASC) 10 mg tablet Take 5 mg by mouth daily. atorvastatin (LIPITOR) 80 mg tablet Take 40 mg by mouth daily. MEN'S MULTI-VITAMIN PO Take 1 Tablet by mouth daily. (Patient not taking: Reported on 2/22/2022)      acetaminophen (Tylenol Arthritis Pain) 650 mg TbER Take 1,300 mg by mouth every twelve (12) hours. ( 650 mg ii tabs BID)   Indications: pain associated with arthritis (Patient not taking: Reported on 2/22/2022)      diphenhydrAMINE (Benadryl Allergy) 25 mg tablet Take 25 mg by mouth two (2) times a day. (Patient not taking: Reported on 2/22/2022)           Results for orders placed or performed in visit on 02/22/22   AMB POC URINALYSIS DIP STICK MANUAL W/O MICRO   Result Value Ref Range    Color (UA POC) Yellow     Clarity (UA POC) Clear     Glucose (UA POC) Negative Negative    Bilirubin (UA POC) Negative Negative    Ketones (UA POC) Trace Negative    Specific gravity (UA POC) 1.030 1.001 - 1.035    Blood (UA POC) Negative Negative    pH (UA POC) 5.5 4.6 - 8.0    Protein (UA POC) Negative Negative    Urobilinogen (UA POC) 0.2 mg/dL 0.2 - 1    Nitrites (UA POC) Negative Negative    Leukocyte esterase (UA POC) Negative Negative         ROS: 12 point system revived,please see HPI for pertinent positives. OBJECTIVE:     Visit Vitals  /76 (BP 1 Location: Left arm, BP Patient Position: Sitting, BP Cuff Size: Adult)   Pulse 71   Temp 98.6 °F (37 °C) (Temporal)   Resp 16   Ht 5' 6.5\" (1.689 m)   Wt 195 lb (88.5 kg)   SpO2 98%   BMI 31.00 kg/m²        Vision meet standards and hearing test good. No results found. Physical Examination:    General appearance - alert, well appearing, and in no distress.     HEENT  PERRLA, EOMI, Sclera clear, anicteric, Oropharynx clear, no lesions. Chest - RRR S1, S2 heard, no peripheral edema. Lungs- Clear to auscultation, no wheezes, rales or rhonchi, has symmetric air entry    Neck- Supple, No adenopathy. Neuro- CN 2 to 11 grossly normal, No neuro deficits              DTR 2/4, strength on upper/lower ext 5/5 sohan    Abdomen/groin- ND,NT, No hernia    ROM: Good ROM of upper and lower extremities. Feet normal      Psy- Mood and Affect WNL      ASSESSMENT/PLAN       ICD-10-CM ICD-9-CM    1. Encounter for occupational physical examination  Z02.89 V70.5 AMB POC URINALYSIS DIP STICK AUTO W/O MICRO      2. Essential hypertension  I10 401.9         Orders Placed This Encounter    AMB POC URINALYSIS DIP STICK AUTO W/O MICRO     Ask him to bring me his colonoscopy report    Healthy appearing person. Advised routine care with PCP. 1 yr DOT card with no restrictions given. DOT papers kept in file and [facility] web site updated. I have discussed the diagnosis with the patient and the intended plan as seen in the above orders. The patient has received an after-visit summary and questions were answered concerning future plans. I have discussed medication side effects and warnings with the patient as well. Pt verbalizes understanding.

## 2024-01-17 ENCOUNTER — OFFICE VISIT (OUTPATIENT)
Dept: FAMILY MEDICINE CLINIC | Age: 69
End: 2024-01-17

## 2024-01-17 VITALS
HEART RATE: 70 BPM | TEMPERATURE: 97 F | OXYGEN SATURATION: 97 % | SYSTOLIC BLOOD PRESSURE: 132 MMHG | BODY MASS INDEX: 31.23 KG/M2 | DIASTOLIC BLOOD PRESSURE: 70 MMHG | RESPIRATION RATE: 18 BRPM | HEIGHT: 67 IN | WEIGHT: 199 LBS

## 2024-01-17 DIAGNOSIS — Z02.89 ENCOUNTER FOR EXAMINATION REQUIRED BY DEPARTMENT OF TRANSPORTATION (DOT): Primary | ICD-10-CM

## 2024-01-17 LAB
BILIRUBIN, URINE, POC: NEGATIVE
BLOOD URINE, POC: NEGATIVE
GLUCOSE URINE, POC: NEGATIVE
KETONES, URINE, POC: NEGATIVE
LEUKOCYTE ESTERASE, URINE, POC: NEGATIVE
NITRITE, URINE, POC: NEGATIVE
PH, URINE, POC: 6.5 (ref 4.6–8)
PROTEIN,URINE, POC: NEGATIVE
SPECIFIC GRAVITY, URINE, POC: 1.01 (ref 1–1.03)
URINALYSIS CLARITY, POC: CLEAR
URINALYSIS COLOR, POC: YELLOW
UROBILINOGEN, POC: NORMAL

## 2024-01-17 PROCEDURE — 81003 URINALYSIS AUTO W/O SCOPE: CPT | Performed by: PHYSICIAN ASSISTANT

## 2024-01-17 RX ORDER — MULTIVIT-MIN/IRON/FOLIC ACID/K 18-600-40
CAPSULE ORAL
COMMUNITY

## 2024-01-17 SDOH — ECONOMIC STABILITY: INCOME INSECURITY: HOW HARD IS IT FOR YOU TO PAY FOR THE VERY BASICS LIKE FOOD, HOUSING, MEDICAL CARE, AND HEATING?: NOT VERY HARD

## 2024-01-17 SDOH — ECONOMIC STABILITY: FOOD INSECURITY: WITHIN THE PAST 12 MONTHS, YOU WORRIED THAT YOUR FOOD WOULD RUN OUT BEFORE YOU GOT MONEY TO BUY MORE.: NEVER TRUE

## 2024-01-17 SDOH — ECONOMIC STABILITY: FOOD INSECURITY: WITHIN THE PAST 12 MONTHS, THE FOOD YOU BOUGHT JUST DIDN'T LAST AND YOU DIDN'T HAVE MONEY TO GET MORE.: NEVER TRUE

## 2024-01-17 SDOH — ECONOMIC STABILITY: HOUSING INSECURITY
IN THE LAST 12 MONTHS, WAS THERE A TIME WHEN YOU DID NOT HAVE A STEADY PLACE TO SLEEP OR SLEPT IN A SHELTER (INCLUDING NOW)?: NO

## 2024-01-17 ASSESSMENT — PATIENT HEALTH QUESTIONNAIRE - PHQ9
10. IF YOU CHECKED OFF ANY PROBLEMS, HOW DIFFICULT HAVE THESE PROBLEMS MADE IT FOR YOU TO DO YOUR WORK, TAKE CARE OF THINGS AT HOME, OR GET ALONG WITH OTHER PEOPLE: 0
SUM OF ALL RESPONSES TO PHQ QUESTIONS 1-9: 0
3. TROUBLE FALLING OR STAYING ASLEEP: 0
SUM OF ALL RESPONSES TO PHQ QUESTIONS 1-9: 0
8. MOVING OR SPEAKING SO SLOWLY THAT OTHER PEOPLE COULD HAVE NOTICED. OR THE OPPOSITE, BEING SO FIGETY OR RESTLESS THAT YOU HAVE BEEN MOVING AROUND A LOT MORE THAN USUAL: 0
5. POOR APPETITE OR OVEREATING: 0
7. TROUBLE CONCENTRATING ON THINGS, SUCH AS READING THE NEWSPAPER OR WATCHING TELEVISION: 0
SUM OF ALL RESPONSES TO PHQ QUESTIONS 1-9: 0
9. THOUGHTS THAT YOU WOULD BE BETTER OFF DEAD, OR OF HURTING YOURSELF: 0
1. LITTLE INTEREST OR PLEASURE IN DOING THINGS: 0
2. FEELING DOWN, DEPRESSED OR HOPELESS: 0
4. FEELING TIRED OR HAVING LITTLE ENERGY: 0
6. FEELING BAD ABOUT YOURSELF - OR THAT YOU ARE A FAILURE OR HAVE LET YOURSELF OR YOUR FAMILY DOWN: 0
SUM OF ALL RESPONSES TO PHQ9 QUESTIONS 1 & 2: 0
SUM OF ALL RESPONSES TO PHQ QUESTIONS 1-9: 0

## 2024-01-17 ASSESSMENT — ANXIETY QUESTIONNAIRES
4. TROUBLE RELAXING: 0
IF YOU CHECKED OFF ANY PROBLEMS ON THIS QUESTIONNAIRE, HOW DIFFICULT HAVE THESE PROBLEMS MADE IT FOR YOU TO DO YOUR WORK, TAKE CARE OF THINGS AT HOME, OR GET ALONG WITH OTHER PEOPLE: NOT DIFFICULT AT ALL
5. BEING SO RESTLESS THAT IT IS HARD TO SIT STILL: 0
1. FEELING NERVOUS, ANXIOUS, OR ON EDGE: 0
6. BECOMING EASILY ANNOYED OR IRRITABLE: 0
7. FEELING AFRAID AS IF SOMETHING AWFUL MIGHT HAPPEN: 0
GAD7 TOTAL SCORE: 0
3. WORRYING TOO MUCH ABOUT DIFFERENT THINGS: 0
2. NOT BEING ABLE TO STOP OR CONTROL WORRYING: 0

## 2024-01-17 ASSESSMENT — ENCOUNTER SYMPTOMS
SHORTNESS OF BREATH: 0
RESPIRATORY NEGATIVE: 1

## 2024-01-17 NOTE — PROGRESS NOTES
Kenny Villagomez is a 68 y.o. 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    he reports feeling well today with no complaints.  H/o cva in 2022. Completed 1 year waiting prd and recert given a year later.  Done well since. No residual or recurrence. Anticoag on plavix. No b/b  Review of Systems   Constitutional: Negative.    Respiratory: Negative.  Negative for shortness of breath.    Cardiovascular: Negative.  Negative for chest pain.   Neurological: Negative.  Negative for dizziness, seizures, facial asymmetry, speech difficulty, weakness and headaches.     *See scanned media for full history and physical exam.    /70 (Site: Left Upper Arm, Position: Sitting, Cuff Size: Large Adult)   Pulse 70   Temp 97 °F (36.1 °C) (Oral)   Resp 18   Ht 1.702 m (5' 7\")   Wt 90.3 kg (199 lb)   SpO2 97%   BMI 31.17 kg/m²     Results for orders placed or performed in visit on 01/17/24   AMB POC URINALYSIS DIP STICK AUTO W/O MICRO   Result Value Ref Range    Color, Urine, POC Yellow     Clarity, Urine, POC Clear     Glucose, Urine, POC Negative     Bilirubin, Urine, POC Negative     Ketones, Urine, POC Negative     Specific Gravity, Urine, POC 1.010 1.001 - 1.035    Blood, Urine, POC Negative     pH, Urine, POC 6.5 4.6 - 8.0    Protein, Urine, POC Negative     Urobilinogen, POC Normal     Nitrite, Urine, POC Negative     Leukocyte Esterase, Urine, POC Negative      DOT forms completed and scanned into patient's chart.  Cert 1 yr.    Esperanza Farr PA-C

## 2024-03-01 ENCOUNTER — HOSPITAL ENCOUNTER (EMERGENCY)
Facility: HOSPITAL | Age: 69
Discharge: HOME | End: 2024-03-01
Attending: EMERGENCY MEDICINE
Payer: MEDICARE

## 2024-03-01 ENCOUNTER — APPOINTMENT (OUTPATIENT)
Facility: HOSPITAL | Age: 69
End: 2024-03-01
Payer: MEDICARE

## 2024-03-01 VITALS
HEIGHT: 67 IN | WEIGHT: 190 LBS | HEART RATE: 77 BPM | BODY MASS INDEX: 29.82 KG/M2 | DIASTOLIC BLOOD PRESSURE: 89 MMHG | OXYGEN SATURATION: 97 % | RESPIRATION RATE: 19 BRPM | TEMPERATURE: 98.7 F | SYSTOLIC BLOOD PRESSURE: 133 MMHG

## 2024-03-01 DIAGNOSIS — R10.9 FLANK PAIN: ICD-10-CM

## 2024-03-01 DIAGNOSIS — N20.1 URETEROLITHIASIS: Primary | ICD-10-CM

## 2024-03-01 LAB
ALBUMIN SERPL-MCNC: 3.8 G/DL (ref 3.5–5)
ALBUMIN/GLOB SERPL: 1.2 (ref 1.1–2.2)
ALP SERPL-CCNC: 83 U/L (ref 45–117)
ALT SERPL-CCNC: 36 U/L (ref 12–78)
ANION GAP SERPL CALC-SCNC: 11 MMOL/L (ref 5–15)
APPEARANCE UR: CLEAR
AST SERPL-CCNC: 31 U/L (ref 15–37)
BACTERIA URNS QL MICRO: NEGATIVE /HPF
BASOPHILS # BLD: 0 K/UL (ref 0–0.1)
BASOPHILS NFR BLD: 0 % (ref 0–1)
BILIRUB SERPL-MCNC: 0.7 MG/DL (ref 0.2–1)
BILIRUB UR QL: NEGATIVE
BUN SERPL-MCNC: 18 MG/DL (ref 6–20)
BUN/CREAT SERPL: 16 (ref 12–20)
CALCIUM SERPL-MCNC: 9.5 MG/DL (ref 8.5–10.1)
CHLORIDE SERPL-SCNC: 106 MMOL/L (ref 97–108)
CO2 SERPL-SCNC: 26 MMOL/L (ref 21–32)
COLOR UR: ABNORMAL
CREAT SERPL-MCNC: 1.12 MG/DL (ref 0.7–1.3)
DIFFERENTIAL METHOD BLD: ABNORMAL
EOSINOPHIL # BLD: 0.1 K/UL (ref 0–0.4)
EOSINOPHIL NFR BLD: 1 % (ref 0–7)
EPITH CASTS URNS QL MICRO: ABNORMAL /LPF
ERYTHROCYTE [DISTWIDTH] IN BLOOD BY AUTOMATED COUNT: 14.1 % (ref 11.5–14.5)
GLOBULIN SER CALC-MCNC: 3.2 G/DL (ref 2–4)
GLUCOSE SERPL-MCNC: 111 MG/DL (ref 65–100)
GLUCOSE UR STRIP.AUTO-MCNC: NEGATIVE MG/DL
HCT VFR BLD AUTO: 38 % (ref 36.6–50.3)
HGB BLD-MCNC: 12.5 G/DL (ref 12.1–17)
HGB UR QL STRIP: ABNORMAL
IMM GRANULOCYTES # BLD AUTO: 0 K/UL (ref 0–0.04)
IMM GRANULOCYTES NFR BLD AUTO: 1 % (ref 0–0.5)
KETONES UR QL STRIP.AUTO: NEGATIVE MG/DL
LEUKOCYTE ESTERASE UR QL STRIP.AUTO: NEGATIVE
LIPASE SERPL-CCNC: 20 U/L (ref 13–75)
LYMPHOCYTES # BLD: 1.3 K/UL (ref 0.8–3.5)
LYMPHOCYTES NFR BLD: 24 % (ref 12–49)
MCH RBC QN AUTO: 27.3 PG (ref 26–34)
MCHC RBC AUTO-ENTMCNC: 32.9 G/DL (ref 30–36.5)
MCV RBC AUTO: 83 FL (ref 80–99)
MONOCYTES # BLD: 0.4 K/UL (ref 0–1)
MONOCYTES NFR BLD: 7 % (ref 5–13)
NEUTS SEG # BLD: 3.8 K/UL (ref 1.8–8)
NEUTS SEG NFR BLD: 67 % (ref 32–75)
NITRITE UR QL STRIP.AUTO: NEGATIVE
NRBC # BLD: 0 K/UL (ref 0–0.01)
NRBC BLD-RTO: 0 PER 100 WBC
PH UR STRIP: 6 (ref 5–8)
PLATELET # BLD AUTO: 279 K/UL (ref 150–400)
PMV BLD AUTO: 8.9 FL (ref 8.9–12.9)
POTASSIUM SERPL-SCNC: 4.2 MMOL/L (ref 3.5–5.1)
PROT SERPL-MCNC: 7 G/DL (ref 6.4–8.2)
PROT UR STRIP-MCNC: NEGATIVE MG/DL
RBC # BLD AUTO: 4.58 M/UL (ref 4.1–5.7)
RBC #/AREA URNS HPF: ABNORMAL /HPF (ref 0–5)
SODIUM SERPL-SCNC: 143 MMOL/L (ref 136–145)
SP GR UR REFRACTOMETRY: 1.02 (ref 1–1.03)
URINE CULTURE IF INDICATED: ABNORMAL
UROBILINOGEN UR QL STRIP.AUTO: 0.2 EU/DL (ref 0.2–1)
WBC # BLD AUTO: 5.6 K/UL (ref 4.1–11.1)
WBC URNS QL MICRO: ABNORMAL /HPF (ref 0–4)

## 2024-03-01 PROCEDURE — 81001 URINALYSIS AUTO W/SCOPE: CPT

## 2024-03-01 PROCEDURE — 74176 CT ABD & PELVIS W/O CONTRAST: CPT

## 2024-03-01 PROCEDURE — 83690 ASSAY OF LIPASE: CPT

## 2024-03-01 PROCEDURE — 36415 COLL VENOUS BLD VENIPUNCTURE: CPT

## 2024-03-01 PROCEDURE — 80053 COMPREHEN METABOLIC PANEL: CPT

## 2024-03-01 PROCEDURE — 99284 EMERGENCY DEPT VISIT MOD MDM: CPT

## 2024-03-01 PROCEDURE — 85025 COMPLETE CBC W/AUTO DIFF WBC: CPT

## 2024-03-01 RX ORDER — NAPROXEN 500 MG/1
500 TABLET ORAL 2 TIMES DAILY PRN
Qty: 20 TABLET | Refills: 0 | Status: SHIPPED | OUTPATIENT
Start: 2024-03-01 | End: 2024-03-11

## 2024-03-01 RX ORDER — TAMSULOSIN HYDROCHLORIDE 0.4 MG/1
0.4 CAPSULE ORAL DAILY
Qty: 10 CAPSULE | Refills: 0 | Status: SHIPPED | OUTPATIENT
Start: 2024-03-01 | End: 2024-03-11

## 2024-03-01 RX ORDER — TAMSULOSIN HYDROCHLORIDE 0.4 MG/1
0.4 CAPSULE ORAL DAILY
Status: DISCONTINUED | OUTPATIENT
Start: 2024-03-02 | End: 2024-03-01

## 2024-03-01 ASSESSMENT — PAIN - FUNCTIONAL ASSESSMENT
PAIN_FUNCTIONAL_ASSESSMENT: 0-10
PAIN_FUNCTIONAL_ASSESSMENT: 0-10

## 2024-03-01 ASSESSMENT — PAIN SCALES - GENERAL
PAINLEVEL_OUTOF10: 0
PAINLEVEL_OUTOF10: 6

## 2024-03-01 NOTE — ED TRIAGE NOTES
Left sided flank pian starting last night that has decreases in intensity but still present.,no n/v/d

## 2024-03-01 NOTE — ED PROVIDER NOTES
[facility] EMERGENCY DEP  EMERGENCY DEPARTMENT ENCOUNTER       Pt Name: Kenny Villagomez  MRN: [id number]  Birthdate 1955  Date of evaluation: 3/1/2024  Provider: Matthew Omalley MD   PCP: Niraj Womack  Note Started: 4:35 PM 3/1/24     CHIEF COMPLAINT       Chief Complaint   Patient presents with    Flank Pain        HISTORY OF PRESENT ILLNESS: 1 or more elements      History From: Patient, spouse, History limited by: none     Kenny Villagomez is a pleasant 69 y.o. male who presents by car for evaluation of left flank pain.  Patient has a history of kidney stones, last episode had very little pain but had a large [facility]  Patient has had surgery on the left ureter to reanastomose with the bladder after prostate surgery.  Patient was restless since midnight last night with flank pain but this afternoon is slightly better.  Patient denies any hematuria or dysuria, no fever.  Patient had nausea but no vomiting.     Nursing Notes were all reviewed and agreed with or any disagreements were addressed in the HPI.     REVIEW OF SYSTEMS        Positives and Pertinent negatives as per HPI.    PAST HISTORY     Past Medical History:  Past Medical History:   Diagnosis Date    Essential hypertension     Right pontine CVA (HCC) 2022       Past Surgical History:  Past Surgical History:   Procedure Laterality Date    HERNIA REPAIR      PROSTATECTOMY         Family History:  Family History   Problem Relation Age of Onset    Dementia Mother        Social History:  Social History     Tobacco Use    Smoking status: Former     Types: Cigarettes     Start date:      Quit date:      Years since quittin.2    Smokeless tobacco: Never   Vaping Use    Vaping Use: Never used   Substance Use Topics    Alcohol use: No    Drug use: No       Allergies:  Allergies   Allergen Reactions    Aspirin Nausea And Vomiting    Penicillins Nausea And Vomiting       CURRENT MEDICATIONS

## 2024-03-02 NOTE — ED NOTES
Patient bedside report received from Nataly DASH. Patient is resting awaiting treatment plans and results.

## 2024-06-04 NOTE — PROGRESS NOTES
IMPRESSION & PLAN:  86-year-old lady status post right lower extremity wound exploration, debridement of skin and subcutaneous fat measuring 11 x 10 cm.    Tramadol and Tylenol for pain control.    Recommend daily dressing changes.  Pack wound with Kerlix fluff gauze moistened with normal saline.  Place ABD pad over this.  Then wrap leg from toes to knee with Kerlix fluff gauze x2 rolls.  Wrap from toes to knee with Ace wrap applying gentle compression.    Responded appropriately to transfusion.  Will stop q12 H/H checks as any significant bleeding would be evident with soaking through dressing.     Okay for discharge from surgical standpoint by tomorrow most likely.    CC:    Chief Complaint   Patient presents with    Leg Pain    Wound Check         HPI: She is frustrated when I see her. Denies pain.    ROS:   Per HPI      PE:    VS:   Vitals:    06/04/24 1332   BP: 142/59   Pulse: 84   Resp: 17   Temp: 97.9 °F (36.6 °C)   SpO2: 100%          Intake/Output Summary (Last 24 hours) at 6/4/2024 1557  Last data filed at 6/4/2024 0500  Gross per 24 hour   Intake 510 ml   Output 1350 ml   Net -840 ml        CONST: Awake, alert  EXT: Ace bandage in place on right lower extremity      LABS:  Results from last 7 days   Lab Units 06/04/24  0626 06/03/24  1600 06/03/24  0404 06/02/24  0538 06/01/24  1008 05/31/24  1420   WBC 10*3/mm3 9.73  --  9.71 10.84* 12.47* 8.77   HEMOGLOBIN g/dL 8.0*   < > 7.2* 6.5* 7.9* 9.1*   HEMATOCRIT % 25.7*   < > 22.4* 21.6* 25.8* 29.8*   PLATELETS 10*3/mm3 217  --  218 205 242 247    < > = values in this interval not displayed.     Results from last 7 days   Lab Units 06/04/24  0626 06/03/24  0404 06/02/24  0538 06/01/24  1008 05/31/24  1420   SODIUM mmol/L 142 142 143   < > 143   POTASSIUM mmol/L 4.1 3.7 3.6   < > 3.7   CHLORIDE mmol/L 107 106 106   < > 107   CO2 mmol/L 30.0* 30.0* 29.0   < > 27.1   BUN mg/dL 17 18 20   < > 15   CREATININE mg/dL 1.02* 1.01* 1.14*   < > 0.98   CALCIUM mg/dL 7.9*  PHYSICAL THERAPY TREATMENT/DISCHARGE  Patient: Lorene Caal (26 y.o. male)  Date: 1/7/2022  Diagnosis: Stroke [facility]) [I63.9]  Cerebrovascular accident (CVA) involving right cerebral hemisphere [facility]) [I63.9] Stroke [facility])      Precautions:  Fall  Chart, physical therapy assessment, plan of care and goals were reviewed. ASSESSMENT  Patient continues with skilled PT services and is progressing towards goals. Pt. Received upright in chair and agreeable to PT. Pt. Was independent with all functional transfers and CGA with ambulation without an assistive device. Pt. Was slightly unsteady but able to correct balance deviations with ambulation throughout. PT recommends single point cane for community/home ambulation at discharge. HHPT/Outpatient PT suggested. Current Level of Function Impacting Discharge (mobility/balance): functional ambulation safety    Other factors to consider for discharge: home set up         PLAN :  Patient continues to benefit from skilled intervention to address the above impairments. Continue treatment per established plan of care. to address goals. Recommendation for discharge: (in order for the patient to meet his/her long term goals)  OP vs HHPT    This discharge recommendation:  Has been made in collaboration with the attending provider and/or case management    IF patient discharges home will need the following DME: straight cane       SUBJECTIVE:   Patient stated Im ready to go home.     OBJECTIVE DATA SUMMARY:   Critical Behavior:  Neurologic State: Alert  Orientation Level: Oriented X4  Cognition: Appropriate for age attention/concentration,Follows commands  Safety/Judgement: Awareness of environment  Functional Mobility Training:  Bed Mobility:  Rolling: Independent  Supine to Sit: Independent  Sit to Supine: Independent  Scooting: Independent        Transfers:  Sit to Stand: Independent  Stand to Sit: Independent        Bed to Chair: Supervision  Balance:  Sitting: 7.6* 7.8*   < > 8.7   BILIRUBIN mg/dL  --   --   --   --  0.4   ALK PHOS U/L  --   --   --   --  70   ALT (SGPT) U/L  --   --   --   --  10   AST (SGOT) U/L  --   --   --   --  11   GLUCOSE mg/dL 92 87 81   < > 110*    < > = values in this interval not displayed.        Intact  Standing - Static: Good; Unsupported  Standing - Dynamic : Fair;Unsupported  Ambulation/Gait Training:  Distance (ft): 150 Feet (ft)  Assistive Device:  (no AD)  Ambulation - Level of Assistance: Contact guard assistance     Gait Description (WDL): Exceptions to WDL  Gait Abnormalities: Path deviations        Base of Support: Widened     Speed/Karime: Fluctuations    Activity Tolerance:   Good    After treatment patient left in no apparent distress:   Sitting in chair    COMMUNICATION/COLLABORATION:   The patients plan of care was discussed with: Physical therapist.     Yaya Uribe, PT, DPT, EP-C   Time Calculation: 24 mins

## 2025-03-15 ENCOUNTER — APPOINTMENT (OUTPATIENT)
Facility: HOSPITAL | Age: 70
End: 2025-03-15
Payer: MEDICARE

## 2025-03-15 ENCOUNTER — HOSPITAL ENCOUNTER (EMERGENCY)
Facility: HOSPITAL | Age: 70
Discharge: HOME | End: 2025-03-15
Attending: EMERGENCY MEDICINE
Payer: MEDICARE

## 2025-03-15 VITALS
RESPIRATION RATE: 20 BRPM | DIASTOLIC BLOOD PRESSURE: 80 MMHG | OXYGEN SATURATION: 95 % | SYSTOLIC BLOOD PRESSURE: 137 MMHG | HEART RATE: 88 BPM | TEMPERATURE: 99.6 F | WEIGHT: 180 LBS | HEIGHT: 67 IN | BODY MASS INDEX: 28.25 KG/M2

## 2025-03-15 DIAGNOSIS — R68.83 CHILLS (WITHOUT FEVER): Primary | ICD-10-CM

## 2025-03-15 DIAGNOSIS — N13.4 HYDROURETER, LEFT: ICD-10-CM

## 2025-03-15 LAB
ALBUMIN SERPL-MCNC: 3.7 G/DL (ref 3.5–5)
ALBUMIN/GLOB SERPL: 1.2 (ref 1.1–2.2)
ALP SERPL-CCNC: 89 U/L (ref 45–117)
ALT SERPL-CCNC: 21 U/L (ref 12–78)
ANION GAP SERPL CALC-SCNC: 9 MMOL/L (ref 2–12)
APPEARANCE UR: CLEAR
AST SERPL-CCNC: 16 U/L (ref 15–37)
BACTERIA URNS QL MICRO: NEGATIVE /HPF
BASOPHILS # BLD: 0.02 K/UL (ref 0–0.1)
BASOPHILS NFR BLD: 0.2 % (ref 0–1)
BILIRUB SERPL-MCNC: 1 MG/DL (ref 0.2–1)
BILIRUB UR QL: NEGATIVE
BUN SERPL-MCNC: 18 MG/DL (ref 6–20)
BUN/CREAT SERPL: 16 (ref 12–20)
CALCIUM SERPL-MCNC: 9.7 MG/DL (ref 8.5–10.1)
CHLORIDE SERPL-SCNC: 103 MMOL/L (ref 97–108)
CO2 SERPL-SCNC: 29 MMOL/L (ref 21–32)
COLOR UR: ABNORMAL
CREAT SERPL-MCNC: 1.14 MG/DL (ref 0.7–1.3)
DIFFERENTIAL METHOD BLD: ABNORMAL
EOSINOPHIL # BLD: 0.07 K/UL (ref 0–0.4)
EOSINOPHIL NFR BLD: 0.6 % (ref 0–7)
EPITH CASTS URNS QL MICRO: ABNORMAL /LPF
ERYTHROCYTE [DISTWIDTH] IN BLOOD BY AUTOMATED COUNT: 13.9 % (ref 11.5–14.5)
FLUAV RNA SPEC QL NAA+PROBE: NOT DETECTED
FLUBV RNA SPEC QL NAA+PROBE: NOT DETECTED
GLOBULIN SER CALC-MCNC: 3 G/DL (ref 2–4)
GLUCOSE SERPL-MCNC: 133 MG/DL (ref 65–100)
GLUCOSE UR STRIP.AUTO-MCNC: NEGATIVE MG/DL
HCT VFR BLD AUTO: 40.2 % (ref 36.6–50.3)
HGB BLD-MCNC: 12.6 G/DL (ref 12.1–17)
HGB UR QL STRIP: ABNORMAL
IMM GRANULOCYTES # BLD AUTO: 0.05 K/UL (ref 0–0.04)
IMM GRANULOCYTES NFR BLD AUTO: 0.5 % (ref 0–0.5)
KETONES UR QL STRIP.AUTO: NEGATIVE MG/DL
LACTATE SERPL-SCNC: 1.6 MMOL/L (ref 0.4–2)
LEUKOCYTE ESTERASE UR QL STRIP.AUTO: NEGATIVE
LYMPHOCYTES # BLD: 2.57 K/UL (ref 0.8–3.5)
LYMPHOCYTES NFR BLD: 23.2 % (ref 12–49)
MAGNESIUM SERPL-MCNC: 1.8 MG/DL (ref 1.6–2.4)
MCH RBC QN AUTO: 26.5 PG (ref 26–34)
MCHC RBC AUTO-ENTMCNC: 31.3 G/DL (ref 30–36.5)
MCV RBC AUTO: 84.5 FL (ref 80–99)
MONOCYTES # BLD: 0.68 K/UL (ref 0–1)
MONOCYTES NFR BLD: 6.1 % (ref 5–13)
NEUTS SEG # BLD: 7.69 K/UL (ref 1.8–8)
NEUTS SEG NFR BLD: 69.4 % (ref 32–75)
NITRITE UR QL STRIP.AUTO: NEGATIVE
NRBC # BLD: 0 K/UL (ref 0–0.01)
NRBC BLD-RTO: 0 PER 100 WBC
PH UR STRIP: 7.5 (ref 5–8)
PLATELET # BLD AUTO: 297 K/UL (ref 150–400)
PMV BLD AUTO: 9.1 FL (ref 8.9–12.9)
POTASSIUM SERPL-SCNC: 3.5 MMOL/L (ref 3.5–5.1)
PROT SERPL-MCNC: 6.7 G/DL (ref 6.4–8.2)
PROT UR STRIP-MCNC: ABNORMAL MG/DL
RBC # BLD AUTO: 4.76 M/UL (ref 4.1–5.7)
RBC #/AREA URNS HPF: ABNORMAL /HPF (ref 0–5)
SARS-COV-2 RNA RESP QL NAA+PROBE: NOT DETECTED
SODIUM SERPL-SCNC: 141 MMOL/L (ref 136–145)
SOURCE: NORMAL
SP GR UR REFRACTOMETRY: 1.01 (ref 1–1.03)
URINE CULTURE IF INDICATED: ABNORMAL
UROBILINOGEN UR QL STRIP.AUTO: 1 EU/DL (ref 0.2–1)
WBC # BLD AUTO: 11.1 K/UL (ref 4.1–11.1)
WBC URNS QL MICRO: ABNORMAL /HPF (ref 0–4)

## 2025-03-15 PROCEDURE — 6360000004 HC RX CONTRAST MEDICATION: Performed by: EMERGENCY MEDICINE

## 2025-03-15 PROCEDURE — 85025 COMPLETE CBC W/AUTO DIFF WBC: CPT

## 2025-03-15 PROCEDURE — 96374 THER/PROPH/DIAG INJ IV PUSH: CPT

## 2025-03-15 PROCEDURE — 80053 COMPREHEN METABOLIC PANEL: CPT

## 2025-03-15 PROCEDURE — 87636 SARSCOV2 & INF A&B AMP PRB: CPT

## 2025-03-15 PROCEDURE — 6370000000 HC RX 637 (ALT 250 FOR IP): Performed by: EMERGENCY MEDICINE

## 2025-03-15 PROCEDURE — 71045 X-RAY EXAM CHEST 1 VIEW: CPT

## 2025-03-15 PROCEDURE — 6360000002 HC RX W HCPCS: Performed by: EMERGENCY MEDICINE

## 2025-03-15 PROCEDURE — 74177 CT ABD & PELVIS W/CONTRAST: CPT

## 2025-03-15 PROCEDURE — 99285 EMERGENCY DEPT VISIT HI MDM: CPT

## 2025-03-15 PROCEDURE — 81001 URINALYSIS AUTO W/SCOPE: CPT

## 2025-03-15 PROCEDURE — 36415 COLL VENOUS BLD VENIPUNCTURE: CPT

## 2025-03-15 PROCEDURE — 2580000003 HC RX 258: Performed by: EMERGENCY MEDICINE

## 2025-03-15 PROCEDURE — 2500000003 HC RX 250 WO HCPCS: Performed by: EMERGENCY MEDICINE

## 2025-03-15 PROCEDURE — 83735 ASSAY OF MAGNESIUM: CPT

## 2025-03-15 PROCEDURE — 83605 ASSAY OF LACTIC ACID: CPT

## 2025-03-15 RX ORDER — IOPAMIDOL 755 MG/ML
100 INJECTION, SOLUTION INTRAVASCULAR
Status: COMPLETED | OUTPATIENT
Start: 2025-03-15 | End: 2025-03-15

## 2025-03-15 RX ORDER — ACETAMINOPHEN 500 MG
1000 TABLET ORAL
Status: COMPLETED | OUTPATIENT
Start: 2025-03-15 | End: 2025-03-15

## 2025-03-15 RX ORDER — LIDOCAINE HYDROCHLORIDE 20 MG/ML
JELLY TOPICAL PRN
Status: DISCONTINUED | OUTPATIENT
Start: 2025-03-15 | End: 2025-03-15 | Stop reason: HOSPADM

## 2025-03-15 RX ORDER — CEPHALEXIN 500 MG/1
500 CAPSULE ORAL 3 TIMES DAILY
Qty: 21 CAPSULE | Refills: 0 | Status: SHIPPED | OUTPATIENT
Start: 2025-03-15 | End: 2025-03-22

## 2025-03-15 RX ORDER — 0.9 % SODIUM CHLORIDE 0.9 %
1000 INTRAVENOUS SOLUTION INTRAVENOUS ONCE
Status: COMPLETED | OUTPATIENT
Start: 2025-03-15 | End: 2025-03-15

## 2025-03-15 RX ORDER — KETOROLAC TROMETHAMINE 10 MG/1
10 TABLET, FILM COATED ORAL EVERY 6 HOURS PRN
Qty: 20 TABLET | Refills: 0 | Status: SHIPPED | OUTPATIENT
Start: 2025-03-15

## 2025-03-15 RX ADMIN — LIDOCAINE HYDROCHLORIDE: 20 JELLY TOPICAL at 03:59

## 2025-03-15 RX ADMIN — WATER 1000 MG: 1 INJECTION INTRAMUSCULAR; INTRAVENOUS; SUBCUTANEOUS at 04:48

## 2025-03-15 RX ADMIN — SODIUM CHLORIDE 1000 ML: 9 INJECTION, SOLUTION INTRAVENOUS at 02:11

## 2025-03-15 RX ADMIN — IOPAMIDOL 100 ML: 755 INJECTION, SOLUTION INTRAVENOUS at 02:50

## 2025-03-15 RX ADMIN — ACETAMINOPHEN 1000 MG: 500 TABLET, FILM COATED ORAL at 02:05

## 2025-03-15 ASSESSMENT — ENCOUNTER SYMPTOMS
SHORTNESS OF BREATH: 0
COUGH: 1
DIARRHEA: 0
NAUSEA: 0
ABDOMINAL PAIN: 0
SORE THROAT: 0
VOMITING: 0
EYE DISCHARGE: 0

## 2025-03-15 ASSESSMENT — PAIN SCALES - GENERAL
PAINLEVEL_OUTOF10: 6
PAINLEVEL_OUTOF10: 6

## 2025-03-15 ASSESSMENT — PAIN DESCRIPTION - LOCATION
LOCATION: BACK
LOCATION: FLANK

## 2025-03-15 ASSESSMENT — PAIN DESCRIPTION - ORIENTATION
ORIENTATION: LEFT
ORIENTATION: LEFT

## 2025-03-15 ASSESSMENT — PAIN - FUNCTIONAL ASSESSMENT: PAIN_FUNCTIONAL_ASSESSMENT: 0-10

## 2025-03-15 NOTE — DISCHARGE INSTRUCTIONS
If you have ANY questions or concerns, please call the Kidney Stone Hotline which is open 24/7 at [phone].    Please take Ibuprofen 600mg every 6 hours OR Naproxen/Aleve 500mg every 12 hours) for pain. NSAIDs reduce the inflammation of the ureter due to the kidney stone that is passing.

## 2025-03-15 NOTE — ED TRIAGE NOTES
Pt reports chills and left sided flank pain that started yesterday. Pt reports taking tylenol yesterday at 1800 with little relief. Pt denies any difficulty with urination or any abdominal pain. Pt is A&Ox4 and ambulatory without difficulty

## 2025-03-15 NOTE — ED PROVIDER NOTES
[facility] EMERGENCY DEPARTMENT  EMERGENCY DEPARTMENT ENCOUNTER       Pt Name: Kenny Villagomez  MRN: [id number]  Birthdate 1955  Date of evaluation: 3/15/2025  Provider: Robert Dhaliwal MD   PCP: Niraj Womack MD  Note Started: 4:29 AM 3/15/25      FINAL IMPRESSION     1. Chills (without fever)    2. Hydroureter, left          DISPOSITION/PLAN     Disposition:  DISPOSITION Decision To Discharge 03/15/2025 04:26:10 AM   DISPOSITION CONDITION Stable           Discharge Note: The patient is stable for discharge home. The signs, symptoms, diagnosis, and discharge instructions have been discussed, understanding conveyed, and agreed upon. The patient is to follow up as recommended or return to ER should their symptoms worsen.      PATIENT REFERRED TO:  Niraj Womack MD  [address] [facility] [address] [place] [id number] [phone]    Schedule an appointment as soon as possible for a visit in 2 days  For Follow Up    [facility] Emergency Department  [address] [place] [address]  [phone]  Go to   if you develop a fever >100.5    Kenny Fitzgerald MD  [address] [facility] [address] [phone]    Schedule an appointment as soon as possible for a visit in 2 days  For Follow Up            DISCHARGE MEDICATIONS:     Medication List        START taking these medications      cephALEXin 500 MG capsule  Commonly known as: KEFLEX  Take 1 capsule by mouth 3 times daily for 7 days     ketorolac 10 MG tablet  Commonly known as: TORADOL  Take 1 tablet by mouth every 6 hours as needed for Pain            ASK your doctor about these medications      amLODIPine 10 MG tablet  Commonly known as: NORVASC     atorvastatin 80 MG tablet  Commonly known as: LIPITOR     clopidogrel 75 MG tablet  Commonly known as: PLAVIX     metoprolol succinate 50 MG extended release tablet  Commonly known as: TOPROL XL     naproxen 500 MG tablet  Commonly known as: NAPROSYN  Take 1 tablet by

## 2025-08-24 ENCOUNTER — APPOINTMENT (OUTPATIENT)
Facility: HOSPITAL | Age: 70
End: 2025-08-24
Payer: OTHER GOVERNMENT

## 2025-08-24 ENCOUNTER — HOSPITAL ENCOUNTER (EMERGENCY)
Facility: HOSPITAL | Age: 70
Discharge: HOME | End: 2025-08-24
Attending: EMERGENCY MEDICINE
Payer: OTHER GOVERNMENT

## 2025-08-24 VITALS
RESPIRATION RATE: 20 BRPM | SYSTOLIC BLOOD PRESSURE: 151 MMHG | HEIGHT: 67 IN | DIASTOLIC BLOOD PRESSURE: 97 MMHG | OXYGEN SATURATION: 97 % | WEIGHT: 209 LBS | TEMPERATURE: 98.2 F | BODY MASS INDEX: 32.8 KG/M2 | HEART RATE: 80 BPM

## 2025-08-24 DIAGNOSIS — S91.112A LACERATION OF LEFT GREAT TOE WITHOUT FOREIGN BODY PRESENT OR DAMAGE TO NAIL, INITIAL ENCOUNTER: Primary | ICD-10-CM

## 2025-08-24 PROCEDURE — 73660 X-RAY EXAM OF TOE(S): CPT

## 2025-08-24 PROCEDURE — 12002 RPR S/N/AX/GEN/TRNK2.6-7.5CM: CPT

## 2025-08-24 PROCEDURE — 6370000000 HC RX 637 (ALT 250 FOR IP): Performed by: EMERGENCY MEDICINE

## 2025-08-24 PROCEDURE — 6360000002 HC RX W HCPCS: Performed by: EMERGENCY MEDICINE

## 2025-08-24 PROCEDURE — 90471 IMMUNIZATION ADMIN: CPT | Performed by: EMERGENCY MEDICINE

## 2025-08-24 PROCEDURE — 90715 TDAP VACCINE 7 YRS/> IM: CPT | Performed by: EMERGENCY MEDICINE

## 2025-08-24 PROCEDURE — 99284 EMERGENCY DEPT VISIT MOD MDM: CPT

## 2025-08-24 RX ORDER — BACITRACIN ZINC 500 [USP'U]/G
OINTMENT TOPICAL 2 TIMES DAILY
Status: DISCONTINUED | OUTPATIENT
Start: 2025-08-24 | End: 2025-08-25 | Stop reason: HOSPADM

## 2025-08-24 RX ORDER — LIDOCAINE HYDROCHLORIDE 20 MG/ML
5 INJECTION, SOLUTION INFILTRATION; PERINEURAL ONCE
Status: COMPLETED | OUTPATIENT
Start: 2025-08-24 | End: 2025-08-24

## 2025-08-24 RX ADMIN — TETANUS TOXOID, REDUCED DIPHTHERIA TOXOID AND ACELLULAR PERTUSSIS VACCINE, ADSORBED 0.5 ML: 5; 2.5; 8; 8; 2.5 SUSPENSION INTRAMUSCULAR at 21:32

## 2025-08-24 RX ADMIN — LIDOCAINE HYDROCHLORIDE 5 ML: 20 INJECTION, SOLUTION INFILTRATION; PERINEURAL at 21:34

## 2025-08-24 RX ADMIN — BACITRACIN ZINC: 500 OINTMENT TOPICAL at 21:34

## 2025-08-24 ASSESSMENT — PAIN DESCRIPTION - LOCATION: LOCATION: TOE (COMMENT WHICH ONE)

## 2025-08-24 ASSESSMENT — PAIN - FUNCTIONAL ASSESSMENT
PAIN_FUNCTIONAL_ASSESSMENT: 0-10

## 2025-08-24 ASSESSMENT — PAIN SCALES - GENERAL
PAINLEVEL_OUTOF10: 0
PAINLEVEL_OUTOF10: 0
PAINLEVEL_OUTOF10: 10

## 2025-08-24 ASSESSMENT — PAIN DESCRIPTION - ORIENTATION: ORIENTATION: LEFT
